# Patient Record
Sex: FEMALE | Race: BLACK OR AFRICAN AMERICAN | NOT HISPANIC OR LATINO | Employment: UNEMPLOYED | URBAN - METROPOLITAN AREA
[De-identification: names, ages, dates, MRNs, and addresses within clinical notes are randomized per-mention and may not be internally consistent; named-entity substitution may affect disease eponyms.]

---

## 2018-08-22 ENCOUNTER — OFFICE VISIT (OUTPATIENT)
Dept: URGENT CARE | Facility: CLINIC | Age: 48
End: 2018-08-22
Payer: COMMERCIAL

## 2018-08-22 VITALS
DIASTOLIC BLOOD PRESSURE: 76 MMHG | WEIGHT: 184 LBS | OXYGEN SATURATION: 99 % | HEART RATE: 70 BPM | BODY MASS INDEX: 31.41 KG/M2 | TEMPERATURE: 98 F | SYSTOLIC BLOOD PRESSURE: 119 MMHG | HEIGHT: 64 IN | RESPIRATION RATE: 16 BRPM

## 2018-08-22 DIAGNOSIS — K64.9 HEMORRHOIDS, UNSPECIFIED HEMORRHOID TYPE: ICD-10-CM

## 2018-08-22 DIAGNOSIS — R42 DIZZINESS: Primary | ICD-10-CM

## 2018-08-22 DIAGNOSIS — H65.03 BILATERAL ACUTE SEROUS OTITIS MEDIA, RECURRENCE NOT SPECIFIED: ICD-10-CM

## 2018-08-22 DIAGNOSIS — B34.9 VIRAL SYNDROME: ICD-10-CM

## 2018-08-22 PROCEDURE — 99214 OFFICE O/P EST MOD 30 MIN: CPT | Mod: S$GLB,,, | Performed by: NURSE PRACTITIONER

## 2018-08-22 RX ORDER — MECLIZINE HCL 12.5 MG 12.5 MG/1
12.5 TABLET ORAL 3 TIMES DAILY PRN
Qty: 12 TABLET | Refills: 0 | Status: SHIPPED | OUTPATIENT
Start: 2018-08-22 | End: 2019-05-02

## 2018-08-22 RX ORDER — PREDNISONE 10 MG/1
10 TABLET ORAL DAILY
Qty: 5 TABLET | Refills: 0 | Status: SHIPPED | OUTPATIENT
Start: 2018-08-22 | End: 2018-08-27

## 2018-08-22 RX ORDER — ALPRAZOLAM 1 MG/1
1 TABLET ORAL 2 TIMES DAILY
COMMUNITY

## 2018-08-22 RX ORDER — LIDOCAINE HYDROCHLORIDE AND HYDROCORTISONE ACETATE 30; 5 MG/G; MG/G
1 CREAM RECTAL 2 TIMES DAILY
Qty: 20 KIT | Refills: 0 | Status: SHIPPED | OUTPATIENT
Start: 2018-08-22 | End: 2019-03-04 | Stop reason: SDUPTHER

## 2018-08-22 RX ORDER — FLUTICASONE PROPIONATE 50 MCG
1 SPRAY, SUSPENSION (ML) NASAL 2 TIMES DAILY
Qty: 1 BOTTLE | Refills: 2 | Status: SHIPPED | OUTPATIENT
Start: 2018-08-22

## 2018-08-22 NOTE — PATIENT INSTRUCTIONS
Inner Ear Problems: Causes of Dizziness (Vertigo)       Benign positional vertigo (BPV)  This is the most common cause of vertigo. BPV is also called benign positional paroxysmal vertigo (BPPV). It happens when crystals in the ear canals shift into the wrong place. Vertigo usually occurs when you move your head in a certain way. This can happen when turning in bed, bending, or looking up. Because BPV comes on quickly, you should think about if you are safe to drive or do other tasks that need your full attention.  BPV:  · Causes vertigo that last for seconds. Vertigo can occur several times a day, depending on body position.  · Doesnt cause hearing loss  · Often goes away on its own. But it but may go away sooner with treatment.  Infection or inflammation  Sometimes the semicircular canals swell and send incorrect balance signals. This problem may be caused by a viral infection. Depending on the cause, your hearing can be affected (labyrinthitis). Or your hearing can remain normal (neuronitis).  Infection or inflammation:  · Causes vertigo that lasts for hours or days. The first episode is usually the worst.  · Can cause hearing loss  · Often goes away on its own. But it may go away sooner with treatment.  You may need vestibular rehabilitation if you have balance problems that don't go away.  Menieres disease  This condition is uncommon. It happens when there is too much fluid in the ear canals. This causes increased pressure and swelling. It affects balance and hearing signals.  Menieres disease may:  · Cause vertigo that last for hours  · Cause hearing problems that come and go. The problems are usually in one ear and get worse over time.  · Cause buzzing or ringing in the ears (tinnitus)  · Cause a feeling of fullness or pressure in the ear  · Cause any of these symptoms: vertigo, hearing loss, tinnitus, or ear fullness to last a lifetime  Date Last Reviewed: 11/1/2016  © 6179-9004 [employer] [address] [place] [address]. All rights reserved. This information is not intended as a substitute for professional medical care. Always follow your healthcare professional's instructions.

## 2018-08-22 NOTE — PROGRESS NOTES
"Subjective:       Patient ID: Ana Maria Turcios is a 47 y.o. female.    Vitals:  height is 5' 4" (1.626 m) and weight is 83.5 kg (184 lb).     Chief Complaint: Dizziness    48 y/o female new to me presents with c/o Dizziness that started x 1 week. Reports she is also struggling with hemorrhoids. Reports being under the care of Dr. Grove.       Dizziness:   Chronicity:  New  Onset:  Yesterday  Progression since onset:  Unchanged  Frequency:  Every few minutes  Pain Scale:  10/10  Severity:  Severe  Duration:  5 minutes  Dizziness characteristics:  Off-balance and lightheaded/impending faint  Frequency of Spells:  Hourlyno fever, no headaches, no nausea, no vomiting, no diaphoresis, no weakness and no chest pain.  Aggravated by:  Nothing  Treatments tried:  Nothing   PMH includes: anxiety.    Review of Systems   Constitution: Negative for chills, diaphoresis, fever, weakness, malaise/fatigue and night sweats.   HENT: Negative for congestion and sore throat.    Cardiovascular: Negative for chest pain.   Respiratory: Negative for cough, shortness of breath and sputum production.    Musculoskeletal: Negative for back pain and joint pain.   Gastrointestinal: Positive for constipation. Negative for abdominal pain, diarrhea, nausea and vomiting.   Neurological: Positive for dizziness. Negative for headaches.        Feels like she is on a boat. Makes eyes heavy   Psychiatric/Behavioral: The patient is nervous/anxious (admits she has been under a lot of stress and not sleeping well. ).        Objective:      Physical Exam   Constitutional: She is oriented to person, place, and time. She appears well-developed and well-nourished.   HENT:   Head: Normocephalic and atraumatic.   Nose: Nose normal.   Mouth/Throat: Oropharyngeal exudate present.   Bilateral pharyngeal erythema, bilateral fluid   Cardiovascular: Normal rate, regular rhythm and normal heart sounds.   Pulmonary/Chest: Effort normal and breath sounds normal. "   Abdominal: Soft. Bowel sounds are normal. There is no tenderness.   Musculoskeletal: She exhibits no edema.   Neurological: She is alert and oriented to person, place, and time.   Skin: Skin is warm and dry.   Psychiatric: She has a normal mood and affect. Her behavior is normal. Judgment and thought content normal.   Nursing note and vitals reviewed.      Assessment:       1. Dizziness    2. Viral syndrome    3. Bilateral acute serous otitis media, recurrence not specified        Plan:         1. Dizziness  Advised that I do believe its secondary to ears and viral syndrome that she is having this. Orthostatics ok.   - meclizine (ANTIVERT) 12.5 mg tablet; Take 1 tablet (12.5 mg total) by mouth 3 (three) times daily as needed for Dizziness.  Dispense: 12 tablet; Refill: 0    2. Viral syndrome  Advised on fluids rest and meds as directed.   - fluticasone (FLONASE) 50 mcg/actuation nasal spray; 1 spray (50 mcg total) by Each Nare route 2 (two) times daily.  Dispense: 1 Bottle; Refill: 2  - predniSONE (DELTASONE) 10 MG tablet; Take 1 tablet (10 mg total) by mouth once daily. for 5 doses  Dispense: 5 tablet; Refill: 0    3. Bilateral acute serous otitis media, recurrence not specified    - fluticasone (FLONASE) 50 mcg/actuation nasal spray; 1 spray (50 mcg total) by Each Nare route 2 (two) times daily.  Dispense: 1 Bottle; Refill: 2  - predniSONE (DELTASONE) 10 MG tablet; Take 1 tablet (10 mg total) by mouth once daily. for 5 doses  Dispense: 5 tablet; Refill: 0

## 2018-11-01 ENCOUNTER — OFFICE VISIT (OUTPATIENT)
Dept: SURGERY | Facility: CLINIC | Age: 48
End: 2018-11-01
Payer: COMMERCIAL

## 2018-11-01 VITALS
HEART RATE: 62 BPM | BODY MASS INDEX: 33.27 KG/M2 | DIASTOLIC BLOOD PRESSURE: 77 MMHG | SYSTOLIC BLOOD PRESSURE: 140 MMHG | HEIGHT: 64 IN | WEIGHT: 194.88 LBS

## 2018-11-01 DIAGNOSIS — K60.2 ANAL FISSURE: Primary | ICD-10-CM

## 2018-11-01 PROCEDURE — 99999 PR PBB SHADOW E&M-EST. PATIENT-LVL III: CPT | Mod: PBBFAC,,, | Performed by: COLON & RECTAL SURGERY

## 2018-11-01 PROCEDURE — 99203 OFFICE O/P NEW LOW 30 MIN: CPT | Mod: S$GLB,,, | Performed by: COLON & RECTAL SURGERY

## 2018-11-01 NOTE — PATIENT INSTRUCTIONS
Increased fiber intake (20-25 grams/day) and fluid intake (8-10 glasses water/day)  Daily fiber supplement  Soaks/sitz baths  Avoid excessive trauma/straining if possible  Topical diltiazem 2% 3x/day

## 2018-11-01 NOTE — LETTER
November 11, 2018      Ernesto Grove MD  [address]  [address]  [facility] [address]           [facility]  [address] [place] [id number]  Phone: [phone]  Fax: [phone]          Patient: Ana Maria Turcios   MR Number: [id number]   YOB: 1970   Date of Visit: 11/1/2018       Dear Dr. Grove:    Thank you for referring Ana Maria Turcios to me for evaluation. Attached you will find relevant portions of my assessment and plan of care.    If you have questions, please do not hesitate to call me. I look forward to following Ana Maria Turcios along with you.    Sincerely,    Trey Streeter MD    Enclosure  CC:  Gvoind Ortiz MD    If you would like to receive this communication electronically, please contact [email or web address] or [phone] to request more information on [employer] access.    For providers and/or their staff who would like to refer a patient to [facility], please contact us through our one-stop-shop provider referral line, [facility] [place], at [phone].    If you feel you have received this communication in error or would no longer like to receive these types of communications, please e-mail [email or web address]

## 2018-11-11 NOTE — PROGRESS NOTES
Subjective:       Patient ID: Ana Maria uTrcios is a 47 y.o. female.    Chief Complaint: Hemorrhoids    HPI  46 yo F who presents for evaluation of rectal bleeding and anal pain. She suffers from chronic constipation and takes Linzess.  She complains of sharp tearing pain with her bowel movements and bright red blood on the toilet paper when she wipes after bowel movements.  She underwent a recent colonoscopy by , which I do not have a report from, but the patient states that was normal aside from hemorrhoids.  No abdominal pain, unexplained weight loss, anorexia, recent change in bowel habits, or other constitutional symptoms.     No family hx of CRC or IBD.      Review of patient's allergies indicates:   Allergen Reactions    Penicillins Itching       Past Medical History:   Diagnosis Date    Anxiety     Chronic constipation     GERD (gastroesophageal reflux disease)     Headache, migraine     Restless leg syndrome        Past Surgical History:   Procedure Laterality Date    ANKLE SURGERY      ANKLE SURGERY Right     BREAST SURGERY  04/2014    left breast cyst    HEMORRHOID SURGERY      TUBAL LIGATION  1997       Current Outpatient Medications   Medication Sig Dispense Refill    ALPRAZolam (XANAX) 1 MG tablet Take 1 mg by mouth 2 (two) times daily.      fluticasone (FLONASE) 50 mcg/actuation nasal spray 1 spray (50 mcg total) by Each Nare route 2 (two) times daily. 1 Bottle 2    lidocaine HCl-hydrocortison ac 3-0.5 % Kit Place 1 application rectally 2 (two) times daily. 20 kit 0    linaclotide (LINZESS) 145 mcg Cap capsule Take 145 mcg by mouth once daily.      meclizine (ANTIVERT) 12.5 mg tablet Take 1 tablet (12.5 mg total) by mouth 3 (three) times daily as needed for Dizziness. 12 tablet 0    trazodone (DESYREL) 25 MG tablet Take by mouth every evening.      diltiazem HCl (DILTIAZEM 2% CREAM) Apply topically 3 (three) times daily. Apply topically to anal area. 50 g 5     No current  facility-administered medications for this visit.        Family History   Problem Relation Age of Onset    Cancer Mother     Stomach cancer Mother     Diabetes Mother     Cancer Father     Throat cancer Father     Cancer Sister     No Known Problems Brother     Hypertension Sister     Hypertension Sister     Hypertension Sister     No Known Problems Brother     No Known Problems Brother     No Known Problems Brother     No Known Problems Brother        Social History     Socioeconomic History    Marital status:      Spouse name: None    Number of children: None    Years of education: None    Highest education level: None   Social Needs    Financial resource strain: None    Food insecurity - worry: None    Food insecurity - inability: None    Transportation needs - medical: None    Transportation needs - non-medical: None   Occupational History    None   Tobacco Use    Smoking status: Current Every Day Smoker     Packs/day: 0.25     Years: 20.00     Pack years: 5.00     Types: Cigarettes    Smokeless tobacco: Never Used   Substance and Sexual Activity    Alcohol use: Yes     Alcohol/week: 0.0 oz     Comment: Socially    Drug use: No    Sexual activity: Yes     Partners: Male     Birth control/protection: Surgical   Other Topics Concern    None   Social History Narrative    None       Review of Systems   Constitutional: Negative for chills and fever.   HENT: Negative for congestion and sore throat.    Eyes: Negative for visual disturbance.   Respiratory: Negative for cough and shortness of breath.    Cardiovascular: Negative for chest pain and palpitations.   Gastrointestinal: Positive for anal bleeding, constipation and rectal pain. Negative for abdominal distention, abdominal pain, blood in stool, diarrhea, nausea and vomiting.        GERD   Endocrine: Negative for cold intolerance and heat intolerance.   Genitourinary: Negative for dysuria and frequency.   Musculoskeletal:  Negative for arthralgias, back pain and neck pain.   Skin: Negative for rash.   Allergic/Immunologic: Negative for immunocompromised state.   Neurological: Positive for headaches. Negative for dizziness and light-headedness.   Hematological: Does not bruise/bleed easily.   Psychiatric/Behavioral: Negative for confusion. The patient is nervous/anxious.        Objective:      Physical Exam   Constitutional: She is oriented to person, place, and time. She appears well-developed and well-nourished.   HENT:   Head: Normocephalic.   Pulmonary/Chest: Effort normal. No respiratory distress.   Abdominal: Soft. Bowel sounds are normal. She exhibits no distension and no mass. There is no tenderness. There is no rebound and no guarding.   Genitourinary:   Genitourinary Comments: Perineum - posterior midline anal fissure, anterior and posterior midline inflamed anal skin tags, otherwise normal perianal skin, no mass, no external hemorrhoids  CORTES/Anoscopy - deferred   Musculoskeletal: Normal range of motion.   Neurological: She is alert and oriented to person, place, and time.   Skin: Skin is warm and dry.   Psychiatric: She has a normal mood and affect.         Lab Results   Component Value Date    WBC 11.50 01/22/2015    HGB 13.0 01/22/2015    HCT 40.2 01/22/2015    MCV 90 01/22/2015     01/22/2015     BMP  Lab Results   Component Value Date     01/22/2015    K 4.0 01/22/2015     01/22/2015    CO2 30 (H) 01/22/2015    BUN 9 01/22/2015    CREATININE 0.8 01/22/2015    CALCIUM 8.9 01/22/2015    ANIONGAP 6 (L) 01/22/2015    ESTGFRAFRICA >60.0 01/22/2015    EGFRNONAA >60.0 01/22/2015     CMP  Sodium   Date Value Ref Range Status   01/22/2015 140 136 - 145 mmol/L Final     Potassium   Date Value Ref Range Status   01/22/2015 4.0 3.5 - 5.1 mmol/L Final     Chloride   Date Value Ref Range Status   01/22/2015 104 95 - 110 mmol/L Final     CO2   Date Value Ref Range Status   01/22/2015 30 (H) 23 - 29 mmol/L Final      Glucose   Date Value Ref Range Status   01/22/2015 90 70 - 110 mg/dL Final     BUN, Bld   Date Value Ref Range Status   01/22/2015 9 6 - 20 mg/dL Final     Creatinine   Date Value Ref Range Status   01/22/2015 0.8 0.5 - 1.4 mg/dL Final     Calcium   Date Value Ref Range Status   01/22/2015 8.9 8.7 - 10.5 mg/dL Final     Total Protein   Date Value Ref Range Status   01/22/2015 6.7 6.0 - 8.4 g/dL Final     Albumin   Date Value Ref Range Status   01/22/2015 3.4 (L) 3.5 - 5.2 g/dL Final     Total Bilirubin   Date Value Ref Range Status   01/22/2015 0.6 0.1 - 1.0 mg/dL Final     Comment:     For infants and newborns, interpretation of results should be based  on gestational age, weight and in agreement with clinical  observations.  Premature Infant recommended reference ranges:  Up to 24 hours.............<8.0 mg/dL  Up to 48 hours............<12.0 mg/dL  3-5 days..................<15.0 mg/dL  6-29 days.................<15.0 mg/dL       Alkaline Phosphatase   Date Value Ref Range Status   01/22/2015 47 (L) 55 - 135 U/L Final     AST   Date Value Ref Range Status   01/22/2015 17 10 - 40 U/L Final     ALT   Date Value Ref Range Status   01/22/2015 18 10 - 44 U/L Final     Anion Gap   Date Value Ref Range Status   01/22/2015 6 (L) 8 - 16 mmol/L Final     eGFR if    Date Value Ref Range Status   01/22/2015 >60.0 >60 mL/min/1.73 m^2 Final     eGFR if non    Date Value Ref Range Status   01/22/2015 >60.0 >60 mL/min/1.73 m^2 Final     Comment:     Calculation used to obtain the estimated glomerular filtration  rate (eGFR) is the CKD-EPI equation. Since race is unknown   in our information system, the eGFR values for   -American and Non--American patients are given   for each creatinine result.       No results found for: CEA        Assessment:       1. Anal fissure        Plan:   Increased fiber intake (20-25 grams/day) and fluid intake (8-10 glasses water/day)  Daily fiber  supplement  Soaks/sitz baths  Avoid excessive trauma/straining if possible  Topical diltiazem 2% tid.  RTO 4 weeks - if no improvement, will consider LIAS.      Trey Streeter MD, FACS, FASCRS  Senior Staff Surgeon  Department of Colon & Rectal Surgery

## 2018-12-19 ENCOUNTER — TELEPHONE (OUTPATIENT)
Dept: SURGERY | Facility: CLINIC | Age: 48
End: 2018-12-19

## 2018-12-19 NOTE — TELEPHONE ENCOUNTER
----- Message from Hayley Lopez sent at 12/19/2018  2:05 PM CST -----  Contact: self [phone]  Needs Advice    Reason for call: Pt called inquiring if the appt needs to be rescheduled on 12/20 in [place]         Communication Preference: self [phone]    Additional Information:

## 2019-03-04 ENCOUNTER — OFFICE VISIT (OUTPATIENT)
Dept: URGENT CARE | Facility: CLINIC | Age: 49
End: 2019-03-04
Payer: COMMERCIAL

## 2019-03-04 ENCOUNTER — TELEPHONE (OUTPATIENT)
Dept: SURGERY | Facility: CLINIC | Age: 49
End: 2019-03-04

## 2019-03-04 VITALS
HEIGHT: 64 IN | BODY MASS INDEX: 33.12 KG/M2 | RESPIRATION RATE: 18 BRPM | TEMPERATURE: 98 F | HEART RATE: 69 BPM | DIASTOLIC BLOOD PRESSURE: 87 MMHG | WEIGHT: 194 LBS | OXYGEN SATURATION: 98 % | SYSTOLIC BLOOD PRESSURE: 131 MMHG

## 2019-03-04 DIAGNOSIS — K60.2 ANAL FISSURE: ICD-10-CM

## 2019-03-04 DIAGNOSIS — K64.9 HEMORRHOIDS, UNSPECIFIED HEMORRHOID TYPE: Primary | ICD-10-CM

## 2019-03-04 PROCEDURE — 99214 OFFICE O/P EST MOD 30 MIN: CPT | Mod: S$GLB,,, | Performed by: PHYSICIAN ASSISTANT

## 2019-03-04 PROCEDURE — 99214 PR OFFICE/OUTPT VISIT, EST, LEVL IV, 30-39 MIN: ICD-10-PCS | Mod: S$GLB,,, | Performed by: PHYSICIAN ASSISTANT

## 2019-03-04 RX ORDER — LIDOCAINE HYDROCHLORIDE AND HYDROCORTISONE ACETATE 30; 5 MG/G; MG/G
1 CREAM RECTAL 2 TIMES DAILY
Qty: 20 KIT | Refills: 0 | Status: ON HOLD | OUTPATIENT
Start: 2019-03-04 | End: 2019-04-11 | Stop reason: HOSPADM

## 2019-03-04 RX ORDER — HYDROCORTISONE ACETATE 25 MG/1
SUPPOSITORY RECTAL
Refills: 2 | COMMUNITY
Start: 2018-11-26 | End: 2019-03-04 | Stop reason: SDUPTHER

## 2019-03-04 RX ORDER — HYDROCORTISONE ACETATE 25 MG/1
25 SUPPOSITORY RECTAL 2 TIMES DAILY PRN
Qty: 12 SUPPOSITORY | Refills: 2 | Status: ON HOLD | OUTPATIENT
Start: 2019-03-04 | End: 2019-04-11 | Stop reason: HOSPADM

## 2019-03-04 RX ORDER — ESCITALOPRAM OXALATE 20 MG/1
TABLET ORAL
Refills: 3 | COMMUNITY
Start: 2018-12-26

## 2019-03-04 RX ORDER — TRAZODONE HYDROCHLORIDE 100 MG/1
TABLET ORAL
Refills: 3 | COMMUNITY
Start: 2018-12-27 | End: 2019-03-04 | Stop reason: ALTCHOICE

## 2019-03-04 NOTE — PATIENT INSTRUCTIONS
1.  Take all medications as directed. If you have been prescribed antibiotics, make sure to complete them.   2.  Rest and keep yourself/patient well hydrated. For adults, it is recommended to drink at least 8-10 glasses of water daily.   3.  For patients above 6 months of age who are not allergic to and are not on anticoagulants, you can alternate Tylenol and Motrin every 4-6 hours for fever above 100.4F and/or pain.  For patients less than 6 months of age, allergic to or intolerant to NSAIDS, have gastritis, gastric ulcers, or history of GI bleeds, are pregnant, or are on anticoagulant therapy, you can take Tylenol every 4 hours as needed for fever above 100.4F and/or pain.   4. You should schedule a follow-up appointment with your Primary Care Provider/Pediatrician for recheck in 2-3 days or as directed at this visit.   5.  If your condition fails to improve in a timely manner, you should receive another evaluation by your Primary Care Provider/Pediatrician to discuss your concerns or return to urgent care for a recheck.  If your condition worsens at any time, you should report immediately to your nearest Emergency Department for further evaluation. **You must understand that you have received Urgent Care treatment only and that you may be released before all of your medical problems are known or treated. You, the patient, are responsible to arrange for follow-up care as instructed.         Hemorrhoids    Hemorrhoids are swollen and inflamed veins inside the rectum and near the anus. The rectum is the last several inches of the colon. The anus is the passage between the rectum and the outside of the body.  Causes  The veins can become swollen due to increased pressure in them. This is most often caused by:  · Chronic constipation or diarrhea  · Straining when having a bowel movement  · Sitting too long on the toilet  · A low-fiber diet  · Pregnancy  Symptoms  · Bleeding from the rectum (this may be noticeable  after bowel movements)  · Lump near the anus  · Itching around the anus  · Pain around the anus  There are different types of hemorrhoids. Depending on the type you have and the severity, you may be able to treat yourself at home. In some cases, a procedure may be the best treatment option. Your healthcare provider can tell you more about this, if needed.  Home care  General care  · To get relief from pain or itching, try:  ¨ Topical products. Your healthcare provider may prescribe or recommend creams, ointments, or pads that can be applied to the hemorrhoid. Use these exactly as directed.  ¨ Medicines. Your healthcare provider may recommend stool softeners, suppositories, or laxatives to help manage constipation. Use these exactly as directed.  ¨ Sitz baths. A sitz bath involves sitting in a few inches of warm bath water. Be careful not to make the water so hot that you burn yourself--test it before sitting in it. Soak for about 10 to 15 minutes a few times a day. This may help relieve pain.  Tips to help prevent hemorrhoids  · Eat more fiber. Fiber adds bulk to stool and absorbs water as it moves through your colon. This makes stool softer and easier to pass.  ¨ Increase the fiber in your diet with more fiber-rich foods. These include fresh fruit, vegetables, and whole grains.  ¨ Take a fiber supplement or bulking agent, if advised to by your provider. These include products such as psyllium or methylcellulose.  · Drink plenty of water, if directed to by your provider. This can help keep stool soft.  · Be more active. Frequent exercise aids digestion and helps prevent constipation. It may also help make bowel movements more regular.  · Dont strain during bowel movements. This can make hemorrhoids more likely. Also, dont sit on the toilet for long periods of time.  Follow-up care  Follow up with your healthcare provider, or as advised. If a culture or imaging tests were done, you will be notified of the results  when they are ready. This may take a few days or longer.  When to seek medical advice  Call your healthcare provider right away if any of these occur:  · Increased bleeding from the rectum  · Increased pain around the rectum or anus  · Weakness or dizziness  Call 911  Call 911 or return to the emergency department right away if any of these occur:  · Trouble breathing or swallowing  · Fainting or loss of consciousness  · Unusually fast heart rate  · Vomiting blood  · Large amounts of blood in stool  Date Last Reviewed: 6/22/2015  © 8621-1208 [employer]. [address] [place] [address]. All rights reserved. This information is not intended as a substitute for professional medical care. Always follow your healthcare professional's instructions.

## 2019-03-04 NOTE — TELEPHONE ENCOUNTER
----- Message from ROSETTE Henley sent at 3/4/2019  9:06 AM CST -----  Contact: pt [phone]  Yes can be filled  ----- Message -----  From: Antoinette Montes De Oca RN  Sent: 3/4/2019   8:54 AM  To: ROSETTE Henley    Can you fill this or should I send it to [facility] to be filled on Wednesday?  Thank you,  Antoinette Rodriguez  ----- Message -----  From: Kenya Bonner  Sent: 3/4/2019   8:32 AM  To: Ferdinand Yang Staff    Rx Refill/Request     Is this a Refill or New Rx:  refill  Rx Name and Strength:  diltiazem HCl (DILTIAZEM 2% CREAM)  Preferred Pharmacy with phone number:   [facility] [phone]    Communication Preference:  Additional Information:

## 2019-03-04 NOTE — PROGRESS NOTES
"Subjective:       Patient ID: Ana Maria Turcios is a 48 y.o. female.    Vitals:  height is 5' 4" (1.626 m) and weight is 88 kg (194 lb). Her tympanic temperature is 98 °F (36.7 °C). Her blood pressure is 131/87 and her pulse is 69. Her respiration is 18 and oxygen saturation is 98%.     Chief Complaint: Hemorrhoids    48-year-old female presents to clinic today requesting a refill of her medications for her hemorrhoids.  Patient states that she sees a colorectal surgeon for these issues but was unable to get an appointment until March 21st.  Patient states that she is currently out of both of her medications and does not feel that she can wait this long for treatment.  Patient reports pain with bowel movements as well as bright red rectal bleeding for the past 4 days.  Patient states that she ate boiled seafood and feels this may have triggered her symptoms.  She denies any other complaints at this time.      Rectal Bleeding   This is a recurrent problem. The current episode started in the past 7 days. The problem occurs constantly. The problem has been gradually worsening. Pertinent negatives include no abdominal pain, arthralgias, chest pain, chills, congestion, fever, headaches, joint swelling, myalgias, nausea, neck pain, rash, sore throat, urinary symptoms, vertigo, visual change, vomiting or weakness. Nothing aggravates the symptoms. Treatments tried: Diltiazem, Lidocaine cream, Preparation H wipes. The treatment provided no relief.       Constitution: Negative for chills and fever.   HENT: Negative for congestion and sore throat.    Neck: Negative for neck pain and painful lymph nodes.   Cardiovascular: Negative for chest pain and leg swelling.   Eyes: Negative for double vision and blurred vision.   Respiratory: Negative for shortness of breath.    Gastrointestinal: Positive for bright red blood in stool, rectal bleeding, rectal pain and hemorrhoids. Negative for abdominal pain, nausea, vomiting, " diarrhea, dark colored stools, heartburn and bowel incontinence.   Genitourinary: Negative for dysuria, frequency, urgency and history of kidney stones.   Musculoskeletal: Negative for joint pain, joint swelling, muscle cramps and muscle ache.   Skin: Negative for color change, pale, rash and bruising.   Allergic/Immunologic: Negative for seasonal allergies.   Neurological: Negative for dizziness, history of vertigo, light-headedness, passing out and headaches.   Hematologic/Lymphatic: Negative for swollen lymph nodes.   Psychiatric/Behavioral: Negative for nervous/anxious, sleep disturbance and depression. The patient is not nervous/anxious.        Objective:      Physical Exam   Constitutional: She is oriented to person, place, and time. She appears well-developed and well-nourished. No distress.   HENT:   Head: Normocephalic and atraumatic.   Right Ear: External ear normal.   Left Ear: External ear normal.   Nose: Nose normal.   Mouth/Throat: Uvula is midline, oropharynx is clear and moist and mucous membranes are normal.   Eyes: Conjunctivae, EOM and lids are normal. Pupils are equal, round, and reactive to light.   Neck: Normal range of motion. Neck supple.   Cardiovascular: Normal rate, regular rhythm and normal heart sounds.   Pulmonary/Chest: Effort normal and breath sounds normal. No respiratory distress.   Abdominal: Soft. Normal appearance and bowel sounds are normal. She exhibits no distension and no mass. There is no tenderness.   Musculoskeletal: Normal range of motion.   Neurological: She is alert and oriented to person, place, and time. She has normal strength. No cranial nerve deficit or sensory deficit.   Skin: Skin is warm. Capillary refill takes less than 2 seconds.   Psychiatric: She has a normal mood and affect. Her speech is normal and behavior is normal. Judgment and thought content normal. Cognition and memory are normal.   Nursing note and vitals reviewed.      Assessment:       1.  Hemorrhoids, unspecified hemorrhoid type    2. Anal fissure        Plan:         Hemorrhoids, unspecified hemorrhoid type  -     lidocaine HCl-hydrocortison ac 3-0.5 % Kit; Place 1 application rectally 2 (two) times daily.  Dispense: 20 kit; Refill: 0    Anal fissure  -     diltiazem HCl (DILTIAZEM 2% CREAM); Apply topically 3 (three) times daily. Apply topically to anal area.  Dispense: 50 g; Refill: 5     Medications refilled as prescribed by colorectal surgeon.  Patient instructed to be sure that she keeps follow-up appointment on March 21st.  ER if worse at any time.    Patient Instructions   1.  Take all medications as directed. If you have been prescribed antibiotics, make sure to complete them.   2.  Rest and keep yourself/patient well hydrated. For adults, it is recommended to drink at least 8-10 glasses of water daily.   3.  For patients above 6 months of age who are not allergic to and are not on anticoagulants, you can alternate Tylenol and Motrin every 4-6 hours for fever above 100.4F and/or pain.  For patients less than 6 months of age, allergic to or intolerant to NSAIDS, have gastritis, gastric ulcers, or history of GI bleeds, are pregnant, or are on anticoagulant therapy, you can take Tylenol every 4 hours as needed for fever above 100.4F and/or pain.   4. You should schedule a follow-up appointment with your Primary Care Provider/Pediatrician for recheck in 2-3 days or as directed at this visit.   5.  If your condition fails to improve in a timely manner, you should receive another evaluation by your Primary Care Provider/Pediatrician to discuss your concerns or return to urgent care for a recheck.  If your condition worsens at any time, you should report immediately to your nearest Emergency Department for further evaluation. **You must understand that you have received Urgent Care treatment only and that you may be released before all of your medical problems are known or treated. You, the  patient, are responsible to arrange for follow-up care as instructed.         Hemorrhoids    Hemorrhoids are swollen and inflamed veins inside the rectum and near the anus. The rectum is the last several inches of the colon. The anus is the passage between the rectum and the outside of the body.  Causes  The veins can become swollen due to increased pressure in them. This is most often caused by:  · Chronic constipation or diarrhea  · Straining when having a bowel movement  · Sitting too long on the toilet  · A low-fiber diet  · Pregnancy  Symptoms  · Bleeding from the rectum (this may be noticeable after bowel movements)  · Lump near the anus  · Itching around the anus  · Pain around the anus  There are different types of hemorrhoids. Depending on the type you have and the severity, you may be able to treat yourself at home. In some cases, a procedure may be the best treatment option. Your healthcare provider can tell you more about this, if needed.  Home care  General care  · To get relief from pain or itching, try:  ¨ Topical products. Your healthcare provider may prescribe or recommend creams, ointments, or pads that can be applied to the hemorrhoid. Use these exactly as directed.  ¨ Medicines. Your healthcare provider may recommend stool softeners, suppositories, or laxatives to help manage constipation. Use these exactly as directed.  ¨ Sitz baths. A sitz bath involves sitting in a few inches of warm bath water. Be careful not to make the water so hot that you burn yourself--test it before sitting in it. Soak for about 10 to 15 minutes a few times a day. This may help relieve pain.  Tips to help prevent hemorrhoids  · Eat more fiber. Fiber adds bulk to stool and absorbs water as it moves through your colon. This makes stool softer and easier to pass.  ¨ Increase the fiber in your diet with more fiber-rich foods. These include fresh fruit, vegetables, and whole grains.  ¨ Take a fiber supplement or bulking  agent, if advised to by your provider. These include products such as psyllium or methylcellulose.  · Drink plenty of water, if directed to by your provider. This can help keep stool soft.  · Be more active. Frequent exercise aids digestion and helps prevent constipation. It may also help make bowel movements more regular.  · Dont strain during bowel movements. This can make hemorrhoids more likely. Also, dont sit on the toilet for long periods of time.  Follow-up care  Follow up with your healthcare provider, or as advised. If a culture or imaging tests were done, you will be notified of the results when they are ready. This may take a few days or longer.  When to seek medical advice  Call your healthcare provider right away if any of these occur:  · Increased bleeding from the rectum  · Increased pain around the rectum or anus  · Weakness or dizziness  Call 911  Call 911 or return to the emergency department right away if any of these occur:  · Trouble breathing or swallowing  · Fainting or loss of consciousness  · Unusually fast heart rate  · Vomiting blood  · Large amounts of blood in stool  Date Last Reviewed: 6/22/2015  © 9346-5909 [employer] [place]. [address] [place] [address], [place] [address]. All rights reserved. This information is not intended as a substitute for professional medical care. Always follow your healthcare professional's instructions.

## 2019-03-13 ENCOUNTER — HOSPITAL ENCOUNTER (EMERGENCY)
Facility: HOSPITAL | Age: 49
Discharge: HOME | End: 2019-03-13
Attending: SURGERY
Payer: COMMERCIAL

## 2019-03-13 VITALS
BODY MASS INDEX: 32.62 KG/M2 | DIASTOLIC BLOOD PRESSURE: 71 MMHG | SYSTOLIC BLOOD PRESSURE: 149 MMHG | TEMPERATURE: 97 F | OXYGEN SATURATION: 100 % | HEART RATE: 72 BPM | WEIGHT: 190.06 LBS | RESPIRATION RATE: 18 BRPM

## 2019-03-13 DIAGNOSIS — K60.2 ANAL FISSURE: Primary | ICD-10-CM

## 2019-03-13 PROCEDURE — 99283 EMERGENCY DEPT VISIT LOW MDM: CPT

## 2019-03-13 RX ORDER — KETOROLAC TROMETHAMINE 10 MG/1
10 TABLET, FILM COATED ORAL EVERY 6 HOURS PRN
Qty: 15 TABLET | Refills: 0 | Status: SHIPPED | OUTPATIENT
Start: 2019-03-13

## 2019-03-13 NOTE — ED NOTES
Discharged to home/self care.    - Condition at discharge: Good  - Mode of Discharge: Ambulatory  - The patient left the ED accompanied by self.  - The discharge instructions were discussed with the patient.  - She states an understanding of the discharge instructions.  - Walked pt to the discharge station.

## 2019-03-13 NOTE — ED PROVIDER NOTES
Encounter Date: 3/13/2019       History     Chief Complaint   Patient presents with    Hemorrhoids     Patient is 48-year-old black female with anal fissure, seen by colon and rectal surgery 2 weeks ago for this.  She is on a conservative management program at this time, scheduled to see colon rectal surgery back in about 2 weeks.  She is having some pain in the area, seeking relief for that at this time.  She also is taking chronic benzodiazepines.          Review of patient's allergies indicates:   Allergen Reactions    Penicillins Itching     Past Medical History:   Diagnosis Date    Anxiety     Chronic constipation     GERD (gastroesophageal reflux disease)     Headache, migraine     Restless leg syndrome      Past Surgical History:   Procedure Laterality Date    ANKLE SURGERY      ANKLE SURGERY Right     BREAST SURGERY  04/2014    left breast cyst    HEMORRHOID SURGERY      TUBAL LIGATION  1997     Family History   Problem Relation Age of Onset    Cancer Mother     Stomach cancer Mother     Diabetes Mother     Cancer Father     Throat cancer Father     Cancer Sister     No Known Problems Brother     Hypertension Sister     Hypertension Sister     Hypertension Sister     No Known Problems Brother     No Known Problems Brother     No Known Problems Brother     No Known Problems Brother      Social History     Tobacco Use    Smoking status: Current Every Day Smoker     Packs/day: 0.25     Years: 20.00     Pack years: 5.00     Types: Cigarettes    Smokeless tobacco: Never Used   Substance Use Topics    Alcohol use: Yes     Alcohol/week: 0.0 oz     Comment: Socially    Drug use: No     Review of Systems   Constitutional: Negative for fever.   HENT: Negative for congestion, ear pain, rhinorrhea, sore throat and trouble swallowing.    Eyes: Negative for pain.   Respiratory: Negative for cough, shortness of breath and wheezing.    Cardiovascular: Negative for chest pain, palpitations and  leg swelling.   Gastrointestinal: Positive for constipation. Negative for abdominal pain, diarrhea and nausea.   Genitourinary: Negative for difficulty urinating, dysuria, flank pain, frequency, hematuria and urgency.   Musculoskeletal: Negative for arthralgias, back pain, myalgias and neck pain.   Skin: Negative for rash and wound.   Neurological: Negative for speech difficulty, weakness and headaches.   Hematological: Does not bruise/bleed easily.       Physical Exam     Initial Vitals [03/13/19 1315]   BP Pulse Resp Temp SpO2   (!) 149/71 72 18 97.2 °F (36.2 °C) 100 %      MAP       --         Physical Exam    Constitutional: No distress.   HENT:   Head: Normocephalic and atraumatic.   Nose: Nose normal.   Mouth/Throat: Oropharynx is clear and moist.   Eyes: Conjunctivae and EOM are normal. Pupils are equal, round, and reactive to light.   Neck: Neck supple.   Cardiovascular: Normal rate, regular rhythm, normal heart sounds and intact distal pulses.   Pulmonary/Chest: Breath sounds normal.   Abdominal: Soft. Bowel sounds are normal. There is no tenderness.   No hemorrhoids noted.  Anal fissure noted. No surrounding gluteal erythema or swelling.   Musculoskeletal: Normal range of motion.   Neurological: She is alert and oriented to person, place, and time. She has normal strength.   Skin: Skin is warm and dry.   Psychiatric: She has a normal mood and affect. Thought content normal.         ED Course   Procedures  Labs Reviewed - No data to display       Imaging Results    None                               Clinical Impression:       ICD-10-CM ICD-9-CM   1. Anal fissure K60.2 565.0         Disposition:   Disposition: Discharged  Condition: Stable                        Bryce Zapata Jr., MD  03/13/19 9315

## 2019-03-13 NOTE — ED TRIAGE NOTES
48 y.o. female presents to ER ED 01/ED 01A   Chief Complaint   Patient presents with    Hemorrhoids   Pt reports hemorrhoid for two weeks, reports that she has a history of constipation and hemorrhoid, but this one is worse than usual. No acute distress noted.

## 2019-03-15 ENCOUNTER — TELEPHONE (OUTPATIENT)
Dept: SURGERY | Facility: CLINIC | Age: 49
End: 2019-03-15

## 2019-03-15 NOTE — TELEPHONE ENCOUNTER
----- Message from Davis Mauricio sent at 3/15/2019  9:00 AM CDT -----  Contact: Pt:[phone]  .Needs Advice    Reason for call:Pt called and states she would like to speak with the nurse to get a letter stating she was out of work due to her procedure. Pt states she is still having sever pain.        Communication Preference:Pt:[phone]    Additional Information:

## 2019-03-15 NOTE — TELEPHONE ENCOUNTER
Left message on voicemail that Dr. Streeter last saw the patient in November 2018 and is unable to give any pain medicine.  There is no procedure seen or done by Dr. Streeter in her chart to write a note for work.

## 2019-03-18 ENCOUNTER — TELEPHONE (OUTPATIENT)
Dept: SURGERY | Facility: CLINIC | Age: 49
End: 2019-03-18

## 2019-03-18 NOTE — TELEPHONE ENCOUNTER
----- Message from Antoinette Gilbert sent at 3/18/2019  1:11 PM CDT -----  Contact: self   lavelle    Needs Advice    Reason for call: has questions re her appt on 3/21        Communication Preference: [phone]    Additional Information:

## 2019-04-01 ENCOUNTER — HOSPITAL ENCOUNTER (OUTPATIENT)
Dept: CARDIOLOGY | Facility: CLINIC | Age: 49
Discharge: HOME | End: 2019-04-01
Payer: COMMERCIAL

## 2019-04-01 ENCOUNTER — OFFICE VISIT (OUTPATIENT)
Dept: SURGERY | Facility: CLINIC | Age: 49
End: 2019-04-01
Payer: COMMERCIAL

## 2019-04-01 ENCOUNTER — LAB VISIT (OUTPATIENT)
Dept: LAB | Facility: HOSPITAL | Age: 49
End: 2019-04-01
Attending: COLON & RECTAL SURGERY
Payer: COMMERCIAL

## 2019-04-01 VITALS
DIASTOLIC BLOOD PRESSURE: 81 MMHG | WEIGHT: 190.06 LBS | HEART RATE: 66 BPM | BODY MASS INDEX: 32.45 KG/M2 | HEIGHT: 64 IN | SYSTOLIC BLOOD PRESSURE: 139 MMHG

## 2019-04-01 DIAGNOSIS — K60.2 ANAL FISSURE: ICD-10-CM

## 2019-04-01 DIAGNOSIS — K59.00 CONSTIPATION, UNSPECIFIED CONSTIPATION TYPE: ICD-10-CM

## 2019-04-01 DIAGNOSIS — K60.2 ANAL FISSURE: Primary | ICD-10-CM

## 2019-04-01 LAB
ANION GAP SERPL CALC-SCNC: 9 MMOL/L (ref 8–16)
BASOPHILS # BLD AUTO: 0.04 K/UL (ref 0–0.2)
BASOPHILS NFR BLD: 0.4 % (ref 0–1.9)
BUN SERPL-MCNC: 11 MG/DL (ref 6–20)
CALCIUM SERPL-MCNC: 9.9 MG/DL (ref 8.7–10.5)
CHLORIDE SERPL-SCNC: 104 MMOL/L (ref 95–110)
CO2 SERPL-SCNC: 26 MMOL/L (ref 23–29)
CREAT SERPL-MCNC: 0.7 MG/DL (ref 0.5–1.4)
DIFFERENTIAL METHOD: ABNORMAL
EOSINOPHIL # BLD AUTO: 0.1 K/UL (ref 0–0.5)
EOSINOPHIL NFR BLD: 0.9 % (ref 0–8)
ERYTHROCYTE [DISTWIDTH] IN BLOOD BY AUTOMATED COUNT: 13 % (ref 11.5–14.5)
EST. GFR  (AFRICAN AMERICAN): >60 ML/MIN/1.73 M^2
EST. GFR  (NON AFRICAN AMERICAN): >60 ML/MIN/1.73 M^2
GLUCOSE SERPL-MCNC: 76 MG/DL (ref 70–110)
HCT VFR BLD AUTO: 47.3 % (ref 37–48.5)
HGB BLD-MCNC: 14.8 G/DL (ref 12–16)
IMM GRANULOCYTES # BLD AUTO: 0.02 K/UL (ref 0–0.04)
IMM GRANULOCYTES NFR BLD AUTO: 0.2 % (ref 0–0.5)
LYMPHOCYTES # BLD AUTO: 3.8 K/UL (ref 1–4.8)
LYMPHOCYTES NFR BLD: 40.4 % (ref 18–48)
MCH RBC QN AUTO: 29.5 PG (ref 27–31)
MCHC RBC AUTO-ENTMCNC: 31.3 G/DL (ref 32–36)
MCV RBC AUTO: 94 FL (ref 82–98)
MONOCYTES # BLD AUTO: 0.7 K/UL (ref 0.3–1)
MONOCYTES NFR BLD: 7.3 % (ref 4–15)
NEUTROPHILS # BLD AUTO: 4.8 K/UL (ref 1.8–7.7)
NEUTROPHILS NFR BLD: 50.8 % (ref 38–73)
NRBC BLD-RTO: 0 /100 WBC
PLATELET # BLD AUTO: 209 K/UL (ref 150–350)
PMV BLD AUTO: 12.2 FL (ref 9.2–12.9)
POTASSIUM SERPL-SCNC: 4.4 MMOL/L (ref 3.5–5.1)
RBC # BLD AUTO: 5.02 M/UL (ref 4–5.4)
SODIUM SERPL-SCNC: 139 MMOL/L (ref 136–145)
WBC # BLD AUTO: 9.4 K/UL (ref 3.9–12.7)

## 2019-04-01 PROCEDURE — 36415 COLL VENOUS BLD VENIPUNCTURE: CPT

## 2019-04-01 PROCEDURE — 99999 PR PBB SHADOW E&M-EST. PATIENT-LVL III: CPT | Mod: PBBFAC,,, | Performed by: COLON & RECTAL SURGERY

## 2019-04-01 PROCEDURE — 93000 EKG 12-LEAD: ICD-10-PCS | Mod: S$GLB,,, | Performed by: INTERNAL MEDICINE

## 2019-04-01 PROCEDURE — 93000 ELECTROCARDIOGRAM COMPLETE: CPT | Mod: S$GLB,,, | Performed by: INTERNAL MEDICINE

## 2019-04-01 PROCEDURE — 99213 PR OFFICE/OUTPT VISIT, EST, LEVL III, 20-29 MIN: ICD-10-PCS | Mod: S$GLB,,, | Performed by: COLON & RECTAL SURGERY

## 2019-04-01 PROCEDURE — 85025 COMPLETE CBC W/AUTO DIFF WBC: CPT

## 2019-04-01 PROCEDURE — 99999 PR PBB SHADOW E&M-EST. PATIENT-LVL III: ICD-10-PCS | Mod: PBBFAC,,, | Performed by: COLON & RECTAL SURGERY

## 2019-04-01 PROCEDURE — 80048 BASIC METABOLIC PNL TOTAL CA: CPT

## 2019-04-01 PROCEDURE — 99213 OFFICE O/P EST LOW 20 MIN: CPT | Mod: S$GLB,,, | Performed by: COLON & RECTAL SURGERY

## 2019-04-01 NOTE — LETTER
April 8, 2019      Ernesto Grove MD  [address]  [address]  [facility] [address]           [facility]  [address]  [facility] [address]  Phone: [phone]          Patient: Ana Maria Turcios   MR Number: [id number]   YOB: 1970   Date of Visit: 4/1/2019       Dear Dr. Grove:    Thank you for referring Ana Maria Turcios to me for evaluation. Attached you will find relevant portions of my assessment and plan of care.    If you have questions, please do not hesitate to call me. I look forward to following Ana Maria Turcios along with you.    Sincerely,    rTey Streeter MD    Enclosure  CC:  Govind Ortiz MD    If you would like to receive this communication electronically, please contact [email or web address] or [phone] to request more information on [employer] access.    For providers and/or their staff who would like to refer a patient to [facility], please contact us through our one-stop-shop provider referral line, [facility], at [phone].    If you feel you have received this communication in error or would no longer like to receive these types of communications, please e-mail [email or web address]

## 2019-04-01 NOTE — H&P (VIEW-ONLY)
Subjective:       Patient ID: Ana Maria Turcios is a 48 y.o. female.    Chief Complaint: Follow-up    HPI  48 yo F who presented 11/1/18 for evaluation of rectal bleeding and anal pain. She suffers from chronic constipation and takes Linzess.  She complained of sharp tearing pain with her bowel movements and bright red blood on the toilet paper when she wiped after bowel movements.  She had undergone a recent colonoscopy by , which I did not have a report from, but the patient stated that was normal aside from hemorrhoids.  No abdominal pain, unexplained weight loss, anorexia, recent change in bowel habits, or other constitutional symptoms.     On examination she had a posterior midline anal fissure as well as moderately inflamed anterior and posterior midline anal skin tags.  She was started on conservative measures including topical diltiazem that he has to follow up in 4 weeks.    Despite the fact that her symptoms did not improve with the above measures, she did not follow up with me until now, 5 months later.  She had been using the diltiazem cream without any improvement.  She continues to suffer from constipation and follows up with Dr. Grove for this.  She currently is taking Linzess, MiraLax and Colace, and states that she is having 1 hard bowel movement a day with significant straining.  She continues to have pain with bowel movements which she describes as ripping and tearing..  No significant bleeding with bowel movements.    No family hx of CRC or IBD.    Review of patient's allergies indicates:   Allergen Reactions    Penicillins Itching       Past Medical History:   Diagnosis Date    Anxiety     Chronic constipation     GERD (gastroesophageal reflux disease)     Headache, migraine     Restless leg syndrome        Past Surgical History:   Procedure Laterality Date    ANKLE SURGERY      ANKLE SURGERY Right     BREAST SURGERY  04/2014    left breast cyst    HEMORRHOID SURGERY       TUBAL LIGATION  1997       Current Outpatient Medications   Medication Sig Dispense Refill    ALPRAZolam (XANAX) 1 MG tablet Take 1 mg by mouth 2 (two) times daily.      ANUCORT-HC 25 mg suppository Place 1 suppository (25 mg total) rectally 2 (two) times daily as needed for Hemorrhoids. 12 suppository 2    diltiazem HCl (DILTIAZEM 2% CREAM) Apply topically 3 (three) times daily. Apply topically to anal area. 50 g 5    escitalopram oxalate (LEXAPRO) 20 MG tablet   3    fluticasone (FLONASE) 50 mcg/actuation nasal spray 1 spray (50 mcg total) by Each Nare route 2 (two) times daily. 1 Bottle 2    ketorolac (TORADOL) 10 mg tablet Take 1 tablet (10 mg total) by mouth every 6 (six) hours as needed for Pain. 15 tablet 0    lidocaine HCl-hydrocortison ac 3-0.5 % Kit Place 1 application rectally 2 (two) times daily. 20 kit 0    linaclotide (LINZESS) 145 mcg Cap capsule Take 145 mcg by mouth once daily.      meclizine (ANTIVERT) 12.5 mg tablet Take 1 tablet (12.5 mg total) by mouth 3 (three) times daily as needed for Dizziness. 12 tablet 0    trazodone (DESYREL) 25 MG tablet Take by mouth every evening.       No current facility-administered medications for this visit.        Family History   Problem Relation Age of Onset    Cancer Mother     Stomach cancer Mother     Diabetes Mother     Cancer Father     Throat cancer Father     Cancer Sister     No Known Problems Brother     Hypertension Sister     Hypertension Sister     Hypertension Sister     No Known Problems Brother     No Known Problems Brother     No Known Problems Brother     No Known Problems Brother        Social History     Socioeconomic History    Marital status:      Spouse name: Not on file    Number of children: Not on file    Years of education: Not on file    Highest education level: Not on file   Occupational History    Not on file   Social Needs    Financial resource strain: Not on file    Food insecurity:      Worry: Not on file     Inability: Not on file    Transportation needs:     Medical: Not on file     Non-medical: Not on file   Tobacco Use    Smoking status: Current Every Day Smoker     Packs/day: 0.25     Years: 20.00     Pack years: 5.00     Types: Cigarettes    Smokeless tobacco: Never Used   Substance and Sexual Activity    Alcohol use: Yes     Alcohol/week: 0.0 oz     Comment: Socially    Drug use: No    Sexual activity: Yes     Partners: Male     Birth control/protection: Surgical   Lifestyle    Physical activity:     Days per week: Not on file     Minutes per session: Not on file    Stress: Not on file   Relationships    Social connections:     Talks on phone: Not on file     Gets together: Not on file     Attends [employer]op[employer] service: Not on file     Active member of club or organization: Not on file     Attends meetings of clubs or organizations: Not on file     Relationship status: Not on file   Other Topics Concern    Not on file   Social History Narrative    Not on file       Review of Systems   Constitutional: Negative for chills and fever.   HENT: Negative for congestion and sore throat.    Eyes: Negative for visual disturbance.   Respiratory: Negative for cough and shortness of breath.    Cardiovascular: Negative for chest pain and palpitations.   Gastrointestinal: Positive for constipation and rectal pain. Negative for abdominal distention, abdominal pain, anal bleeding, blood in stool, diarrhea, nausea and vomiting.        GERD   Endocrine: Negative for cold intolerance and heat intolerance.   Genitourinary: Negative for dysuria and frequency.   Musculoskeletal: Negative for arthralgias, back pain and neck pain.   Skin: Negative for rash.   Allergic/Immunologic: Negative for immunocompromised state.   Neurological: Positive for headaches. Negative for dizziness and light-headedness.   Hematological: Does not bruise/bleed easily.   Psychiatric/Behavioral: Negative for confusion. The patient  is nervous/anxious.        Objective:      Physical Exam   Constitutional: She is oriented to person, place, and time. She appears well-developed and well-nourished.   HENT:   Head: Normocephalic.   Pulmonary/Chest: Effort normal. No respiratory distress.   Abdominal: Soft. Bowel sounds are normal. She exhibits no distension and no mass. There is no tenderness. There is no rebound and no guarding.   Genitourinary:   Genitourinary Comments: Perineum - persistent, chronic appearing posterior midline anal fissure   Musculoskeletal: Normal range of motion.   Neurological: She is alert and oriented to person, place, and time.   Skin: Skin is warm and dry.   Psychiatric: She has a normal mood and affect.         Lab Results   Component Value Date    WBC 9.40 04/01/2019    HGB 14.8 04/01/2019    HCT 47.3 04/01/2019    MCV 94 04/01/2019     04/01/2019     BMP  Lab Results   Component Value Date     04/01/2019    K 4.4 04/01/2019     04/01/2019    CO2 26 04/01/2019    BUN 11 04/01/2019    CREATININE 0.7 04/01/2019    CALCIUM 9.9 04/01/2019    ANIONGAP 9 04/01/2019    ESTGFRAFRICA >60.0 04/01/2019    EGFRNONAA >60.0 04/01/2019     CMP  Sodium   Date Value Ref Range Status   04/01/2019 139 136 - 145 mmol/L Final     Potassium   Date Value Ref Range Status   04/01/2019 4.4 3.5 - 5.1 mmol/L Final     Chloride   Date Value Ref Range Status   04/01/2019 104 95 - 110 mmol/L Final     CO2   Date Value Ref Range Status   04/01/2019 26 23 - 29 mmol/L Final     Glucose   Date Value Ref Range Status   04/01/2019 76 70 - 110 mg/dL Final     BUN, Bld   Date Value Ref Range Status   04/01/2019 11 6 - 20 mg/dL Final     Creatinine   Date Value Ref Range Status   04/01/2019 0.7 0.5 - 1.4 mg/dL Final     Calcium   Date Value Ref Range Status   04/01/2019 9.9 8.7 - 10.5 mg/dL Final     Total Protein   Date Value Ref Range Status   01/22/2015 6.7 6.0 - 8.4 g/dL Final     Albumin   Date Value Ref Range Status   01/22/2015 3.4  (L) 3.5 - 5.2 g/dL Final     Total Bilirubin   Date Value Ref Range Status   01/22/2015 0.6 0.1 - 1.0 mg/dL Final     Comment:     For infants and newborns, interpretation of results should be based  on gestational age, weight and in agreement with clinical  observations.  Premature Infant recommended reference ranges:  Up to 24 hours.............<8.0 mg/dL  Up to 48 hours............<12.0 mg/dL  3-5 days..................<15.0 mg/dL  6-29 days.................<15.0 mg/dL       Alkaline Phosphatase   Date Value Ref Range Status   01/22/2015 47 (L) 55 - 135 U/L Final     AST   Date Value Ref Range Status   01/22/2015 17 10 - 40 U/L Final     ALT   Date Value Ref Range Status   01/22/2015 18 10 - 44 U/L Final     Anion Gap   Date Value Ref Range Status   04/01/2019 9 8 - 16 mmol/L Final     eGFR if    Date Value Ref Range Status   04/01/2019 >60.0 >60 mL/min/1.73 m^2 Final     eGFR if non    Date Value Ref Range Status   04/01/2019 >60.0 >60 mL/min/1.73 m^2 Final     Comment:     Calculation used to obtain the estimated glomerular filtration  rate (eGFR) is the CKD-EPI equation.        No results found for: CEA        Assessment:       1. Anal fissure    2. 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  She is scheduled for an LIAS 04/11/2019 at [facility].    I have discussed the procedure at length with Ana Maria Turcios.  We discussed the rationale, risks, benefits, and alternatives in depth.  We discussed the expected outcomes and potential complications including but not limited to bleeding, infection, recurrence, prolonged pain, need for further procedures and altered continence.  She verbalized her understanding of the procedure and wishes to proceed.  Written consent was obtained.    She will continue to follow up  with Dr. Perfecto marinelli for management of her chronic constipation.    Trey Streeter MD, FACS, FASCRS  Senior Staff Surgeon  Department of Colon & Rectal Surgery

## 2019-04-01 NOTE — LETTER
April 1, 2019      [facility]  [address]  [facility] [address]  Phone: [phone]       Patient: Ana Maria Turcios   YOB: 1970  Date of Visit: 04/01/2019    To Whom It May Concern:    Julia Turcios  was at [facility] on 04/01/2019. She may return to work on 04/02/2019 with no restrictions. If you have any questions or concerns, or if I can be of further assistance, please do not hesitate to contact me.    Sincerely,    Demetrice Rushing MA

## 2019-04-01 NOTE — PROGRESS NOTES
Subjective:       Patient ID: Ana Maria Turcios is a 48 y.o. female.    Chief Complaint: Follow-up    HPI  46 yo F who presented 11/1/18 for evaluation of rectal bleeding and anal pain. She suffers from chronic constipation and takes Linzess.  She complained of sharp tearing pain with her bowel movements and bright red blood on the toilet paper when she wiped after bowel movements.  She had undergone a recent colonoscopy by , which I did not have a report from, but the patient stated that was normal aside from hemorrhoids.  No abdominal pain, unexplained weight loss, anorexia, recent change in bowel habits, or other constitutional symptoms.     On examination she had a posterior midline anal fissure as well as moderately inflamed anterior and posterior midline anal skin tags.  She was started on conservative measures including topical diltiazem that he has to follow up in 4 weeks.    Despite the fact that her symptoms did not improve with the above measures, she did not follow up with me until now, 5 months later.  She had been using the diltiazem cream without any improvement.  She continues to suffer from constipation and follows up with Dr. Grove for this.  She currently is taking Linzess, MiraLax and Colace, and states that she is having 1 hard bowel movement a day with significant straining.  She continues to have pain with bowel movements which she describes as ripping and tearing..  No significant bleeding with bowel movements.    No family hx of CRC or IBD.    Review of patient's allergies indicates:   Allergen Reactions    Penicillins Itching       Past Medical History:   Diagnosis Date    Anxiety     Chronic constipation     GERD (gastroesophageal reflux disease)     Headache, migraine     Restless leg syndrome        Past Surgical History:   Procedure Laterality Date    ANKLE SURGERY      ANKLE SURGERY Right     BREAST SURGERY  04/2014    left breast cyst    HEMORRHOID SURGERY       TUBAL LIGATION  1997       Current Outpatient Medications   Medication Sig Dispense Refill    ALPRAZolam (XANAX) 1 MG tablet Take 1 mg by mouth 2 (two) times daily.      ANUCORT-HC 25 mg suppository Place 1 suppository (25 mg total) rectally 2 (two) times daily as needed for Hemorrhoids. 12 suppository 2    diltiazem HCl (DILTIAZEM 2% CREAM) Apply topically 3 (three) times daily. Apply topically to anal area. 50 g 5    escitalopram oxalate (LEXAPRO) 20 MG tablet   3    fluticasone (FLONASE) 50 mcg/actuation nasal spray 1 spray (50 mcg total) by Each Nare route 2 (two) times daily. 1 Bottle 2    ketorolac (TORADOL) 10 mg tablet Take 1 tablet (10 mg total) by mouth every 6 (six) hours as needed for Pain. 15 tablet 0    lidocaine HCl-hydrocortison ac 3-0.5 % Kit Place 1 application rectally 2 (two) times daily. 20 kit 0    linaclotide (LINZESS) 145 mcg Cap capsule Take 145 mcg by mouth once daily.      meclizine (ANTIVERT) 12.5 mg tablet Take 1 tablet (12.5 mg total) by mouth 3 (three) times daily as needed for Dizziness. 12 tablet 0    trazodone (DESYREL) 25 MG tablet Take by mouth every evening.       No current facility-administered medications for this visit.        Family History   Problem Relation Age of Onset    Cancer Mother     Stomach cancer Mother     Diabetes Mother     Cancer Father     Throat cancer Father     Cancer Sister     No Known Problems Brother     Hypertension Sister     Hypertension Sister     Hypertension Sister     No Known Problems Brother     No Known Problems Brother     No Known Problems Brother     No Known Problems Brother        Social History     Socioeconomic History    Marital status:      Spouse name: Not on file    Number of children: Not on file    Years of education: Not on file    Highest education level: Not on file   Occupational History    Not on file   Social Needs    Financial resource strain: Not on file    Food insecurity:      Worry: Not on file     Inability: Not on file    Transportation needs:     Medical: Not on file     Non-medical: Not on file   Tobacco Use    Smoking status: Current Every Day Smoker     Packs/day: 0.25     Years: 20.00     Pack years: 5.00     Types: Cigarettes    Smokeless tobacco: Never Used   Substance and Sexual Activity    Alcohol use: Yes     Alcohol/week: 0.0 oz     Comment: Socially    Drug use: No    Sexual activity: Yes     Partners: Male     Birth control/protection: Surgical   Lifestyle    Physical activity:     Days per week: Not on file     Minutes per session: Not on file    Stress: Not on file   Relationships    Social connections:     Talks on phone: Not on file     Gets together: Not on file     Attends Anabaptist service: Not on file     Active member of club or organization: Not on file     Attends meetings of clubs or organizations: Not on file     Relationship status: Not on file   Other Topics Concern    Not on file   Social History Narrative    Not on file       Review of Systems   Constitutional: Negative for chills and fever.   HENT: Negative for congestion and sore throat.    Eyes: Negative for visual disturbance.   Respiratory: Negative for cough and shortness of breath.    Cardiovascular: Negative for chest pain and palpitations.   Gastrointestinal: Positive for constipation and rectal pain. Negative for abdominal distention, abdominal pain, anal bleeding, blood in stool, diarrhea, nausea and vomiting.        GERD   Endocrine: Negative for cold intolerance and heat intolerance.   Genitourinary: Negative for dysuria and frequency.   Musculoskeletal: Negative for arthralgias, back pain and neck pain.   Skin: Negative for rash.   Allergic/Immunologic: Negative for immunocompromised state.   Neurological: Positive for headaches. Negative for dizziness and light-headedness.   Hematological: Does not bruise/bleed easily.   Psychiatric/Behavioral: Negative for confusion. The patient  is nervous/anxious.        Objective:      Physical Exam   Constitutional: She is oriented to person, place, and time. She appears well-developed and well-nourished.   HENT:   Head: Normocephalic.   Pulmonary/Chest: Effort normal. No respiratory distress.   Abdominal: Soft. Bowel sounds are normal. She exhibits no distension and no mass. There is no tenderness. There is no rebound and no guarding.   Genitourinary:   Genitourinary Comments: Perineum - persistent, chronic appearing posterior midline anal fissure   Musculoskeletal: Normal range of motion.   Neurological: She is alert and oriented to person, place, and time.   Skin: Skin is warm and dry.   Psychiatric: She has a normal mood and affect.         Lab Results   Component Value Date    WBC 9.40 04/01/2019    HGB 14.8 04/01/2019    HCT 47.3 04/01/2019    MCV 94 04/01/2019     04/01/2019     BMP  Lab Results   Component Value Date     04/01/2019    K 4.4 04/01/2019     04/01/2019    CO2 26 04/01/2019    BUN 11 04/01/2019    CREATININE 0.7 04/01/2019    CALCIUM 9.9 04/01/2019    ANIONGAP 9 04/01/2019    ESTGFRAFRICA >60.0 04/01/2019    EGFRNONAA >60.0 04/01/2019     CMP  Sodium   Date Value Ref Range Status   04/01/2019 139 136 - 145 mmol/L Final     Potassium   Date Value Ref Range Status   04/01/2019 4.4 3.5 - 5.1 mmol/L Final     Chloride   Date Value Ref Range Status   04/01/2019 104 95 - 110 mmol/L Final     CO2   Date Value Ref Range Status   04/01/2019 26 23 - 29 mmol/L Final     Glucose   Date Value Ref Range Status   04/01/2019 76 70 - 110 mg/dL Final     BUN, Bld   Date Value Ref Range Status   04/01/2019 11 6 - 20 mg/dL Final     Creatinine   Date Value Ref Range Status   04/01/2019 0.7 0.5 - 1.4 mg/dL Final     Calcium   Date Value Ref Range Status   04/01/2019 9.9 8.7 - 10.5 mg/dL Final     Total Protein   Date Value Ref Range Status   01/22/2015 6.7 6.0 - 8.4 g/dL Final     Albumin   Date Value Ref Range Status   01/22/2015 3.4  (L) 3.5 - 5.2 g/dL Final     Total Bilirubin   Date Value Ref Range Status   01/22/2015 0.6 0.1 - 1.0 mg/dL Final     Comment:     For infants and newborns, interpretation of results should be based  on gestational age, weight and in agreement with clinical  observations.  Premature Infant recommended reference ranges:  Up to 24 hours.............<8.0 mg/dL  Up to 48 hours............<12.0 mg/dL  3-5 days..................<15.0 mg/dL  6-29 days.................<15.0 mg/dL       Alkaline Phosphatase   Date Value Ref Range Status   01/22/2015 47 (L) 55 - 135 U/L Final     AST   Date Value Ref Range Status   01/22/2015 17 10 - 40 U/L Final     ALT   Date Value Ref Range Status   01/22/2015 18 10 - 44 U/L Final     Anion Gap   Date Value Ref Range Status   04/01/2019 9 8 - 16 mmol/L Final     eGFR if    Date Value Ref Range Status   04/01/2019 >60.0 >60 mL/min/1.73 m^2 Final     eGFR if non    Date Value Ref Range Status   04/01/2019 >60.0 >60 mL/min/1.73 m^2 Final     Comment:     Calculation used to obtain the estimated glomerular filtration  rate (eGFR) is the CKD-EPI equation.        No results found for: CEA        Assessment:       1. Anal fissure    2. 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  She is scheduled for an LIAS 04/11/2019 at [facility].    I have discussed the procedure at length with Ana Maria Turcios.  We discussed the rationale, risks, benefits, and alternatives in depth.  We discussed the expected outcomes and potential complications including but not limited to bleeding, infection, recurrence, prolonged pain, need for further procedures and altered continence.  She verbalized her understanding of the procedure and wishes to proceed.  Written consent was obtained.    She will continue to follow up  with Dr. Perfecto marinelli for management of her chronic constipation.    Trey Streeter MD, FACS, FASCRS  Senior Staff Surgeon  Department of Colon & Rectal Surgery

## 2019-04-01 NOTE — LETTER
April 1, 2019      [facility]  [address]  [facility] [address]  Phone: [phone]       Patient: Ana Maria Turcios   YOB: 1970  Date of Visit: 04/01/2019    To Whom It May Concern:    Julia Turcios  was at [facility] on 04/01/2019. Her  and caregiver was with her. He will return to work 04/02/2019. If you have any questions or concerns, or if I can be of further assistance, please do not hesitate to contact me.    Sincerely,    Demetrice Rushing MA

## 2019-04-08 ENCOUNTER — ANESTHESIA EVENT (OUTPATIENT)
Dept: SURGERY | Facility: HOSPITAL | Age: 49
End: 2019-04-08
Payer: COMMERCIAL

## 2019-04-08 ENCOUNTER — HOSPITAL ENCOUNTER (OUTPATIENT)
Dept: PREADMISSION TESTING | Facility: HOSPITAL | Age: 49
Discharge: HOME | End: 2019-04-08
Attending: COLON & RECTAL SURGERY
Payer: COMMERCIAL

## 2019-04-08 VITALS — WEIGHT: 180 LBS | BODY MASS INDEX: 30.73 KG/M2 | HEIGHT: 64 IN

## 2019-04-08 NOTE — ANESTHESIA PREPROCEDURE EVALUATION
04/08/2019  Ana Maria Turcios is a 48 y.o., female.    Anesthesia Evaluation    I have reviewed the Patient Summary Reports.    I have reviewed the Nursing Notes.   I have reviewed the Medications.     Review of Systems  Anesthesia Hx:  No problems with previous Anesthesia    Social:  Non-Smoker, Smoker    Hematology/Oncology:  Hematology Normal   Oncology Normal     EENT/Dental:EENT/Dental Normal   Cardiovascular:  Cardiovascular Normal Exercise tolerance: good     Pulmonary:  Pulmonary Normal    Renal/:  Renal/ Normal     Hepatic/GI:   GERD, well controlled    Musculoskeletal:  Musculoskeletal Normal    Neurological:   Headaches    Endocrine:  Endocrine Normal    Dermatological:  Skin Normal    Psych:  Psychiatric Normal           Physical Exam  General:  Well nourished    Airway/Jaw/Neck:  Airway Findings: Tongue: Normal General Airway Assessment: Adult  Mallampati: II  TM Distance: Normal, at least 6 cm  Jaw/Neck Findings:  Neck ROM: Normal ROM      Dental:  Dental Findings: In tact        Mental Status:  Mental Status Findings:  Cooperative         Anesthesia Plan  Type of Anesthesia, risks & benefits discussed:  Anesthesia Type:  general  Patient's Preference:   Intra-op Monitoring Plan: standard ASA monitors  Intra-op Monitoring Plan Comments:   Post Op Pain Control Plan: multimodal analgesia  Post Op Pain Control Plan Comments:   Induction:   IV  Beta Blocker:  Patient is not currently on a Beta-Blocker (No further documentation required).       Informed Consent: Patient understands risks and agrees with Anesthesia plan.  Questions answered.   ASA Score: 2     Day of Surgery Review of History & Physical: I have interviewed and examined the patient. I have reviewed the patient's H&P dated: 4/11/19. There are no significant changes.  H&P update referred to the surgeon.         Ready For Surgery  From Anesthesia Perspective.

## 2019-04-08 NOTE — DISCHARGE INSTRUCTIONS
Pre Admit Instructions    Day and Date of Procedure: Thursday 4/11/19      · Call your doctor if you become ill before your surgery  · Someone will call you between 1 p.m. And 5 p.m.the workday before the procedure to give you an arrival time       - Before 7 a.m. Enter through Emergency Room       - 7 a.m. To 5 p.m. Enter through Patient Registration Main Lobby  · You must have a responsible  to bring you home    Do NOT eat or drink anything   past midnight before your procedure day    Please    · Do not wear makeup, jewelry, nail polish or body piercings  · Bring containers/solution for contacts, dentures, bridges - these and hearing aids will be removed before your procedure  · Do not bring cash, jewelry or valuables the day of your procedure   · No smoking at least 24 hours before your procedure  · Wear clothing that is comfortable and easy to take off and put on  · Do NOT shave for at least 5 days before your surgery    Review skin preparation handout before using. Shower with Hibiclens the Night before the procedure. Bring remaining Hibiclens with you the morning of surgery.                Information about your stay (Please Review)    Before Surgery  1. Cafeteria Meals: 7am to 10am; 11am to 1:30 pm; Dinner/Supper must may be ordered between 11:00 am and 4 pm from the [facility] [employer] Menu. Food will be available to  between 5 pm and 6 pm. The kitchen phone extension is [phone].  2. Your doctor may order and review labs, x-rays, ECG or other tests as a pre-surgery workup and will call you if there is need for follow up.  3. No smoking inside or outside the hospital on hospital grounds.  4. Wear clothing that is easy to take off and put on.  The hospital will provide you with a gown.  5. You may bring robe, slippers, nightwear, and toiletries (toothbrush, toothpaste, makeup).  6. If your doctor orders a Fleets Enema or other prep, follow package and/or doctors orders.  7. Brush your teeth and  rinse your mouth the morning of surgery, but dont swallow the water.  8. The nurse will ask questions and check your condition.  The doctor may judd your surgical site.  9. Compression boots may be put on your calves to reduce the risk of blood clots.  10. The doctor may order medicine to help you relax before surgery.  After Surgery  1. The nurse will check your temperature, breathing, blood pressure, heart rate, IV site, and surgery site.  2. A diet will be ordered-most start with ice chips and then advance slowly to other foods.  3. If you have IV fluids the IV pump will beep to let the nurse know that she needs to check it.  4. You may have a urinary catheter and staff may measure your oral intake and urine output.  5. Pain medication may be ordered by the doctor after surgery.  If you have a pain management device tell your caretakers not to press the button because of OVERDOSE RISK.  6. When the nurse or doctor tells you it is okay to get out of bed, ask for help until you are stable.  7. The nurse may ask you to turn, cough, and deep breathe to prevent lung problems.  You can use a pillow to hold your incision when you deep breathe or cough to reduce pain.  8. The nurse will give you discharge instructions--incision care, symptoms to report to your doctor, and your follow-up appointment when you are discharged.  You cannot drive yourself home.  Goal for Discharge from One Day Surgery  · Control pain with an oral medication  · Walk without feeling dizzy or weak  · Tolerate liquids well  · Urinate without difficulty    Things you can do to  Reduce the Risk of Infections or Complications  Wash Hands and use Waterless Hand Sanitizers  · Wash hands frequently with soap and warm water for at least 15 seconds.   · Use hand sanitizers (alcohol based) often at home and in public if hands are not visibly soiled  Take Antibiotic Exactly as Prescribed  · Do not stop antibiotics too soon; you risk developing infection  resistant to antibiotics  · Take your antibiotic even if you are feeling better and even if they upset your stomach  · Call the doctor if you cant tolerate the antibiotic or you have an allergic reaction  Stay Healthy  · Take medicines as prescribed by your doctor  · Keep your diabetes under control - diet and medication  · Get enough rest, exercise and eat a healthy diet  Keep the Wound Clean and Dry  · Wash hands before and after taking care of the incision (cut)  · Wash hands when you remove a dressing, before you touch/apply a new dressing  · Shower and clean incision with antibacterial soap and rinse well if the doctor approves  · Allow the cut to dry completely before putting on a clean dressing  · Do not touch the part of the bandage that will cover the incision  · Do not use ointments unless your doctor tells you to-can promote bacterial growth  · If ordered, put ointment directly on the dressing-do not touch the end of the tube  · Do not scrub, remove scabs, or leave a damp dressing on the incision  · Do not use peroxide or alcohol to clean the incision unless the doctor tells you to   · Do not let children, pets or anyone else contaminate the incision  Stop Smoking To Prevent Infection  · Stop smoking-Centers for Disease Control recommends 30 days before surgery  · Smokers get more infections after surgery-studies have shown 6 times the risk  · Smokers have more scarring and heal slower-open wounds get infected easier  Prevent Respiratory complications  · Stop smoking  · Turn, cough, and deep breathe even if you have some pain when you do so.  · Splint your incision with a pillow when you cough/deep breath, to help control pain.  · Do not lie in one position for long periods of time.   Prevent Blood Clots  · When you wake move your legs, flex your feet, rotate your ankles, wiggle your toes  · Get up when the doctor says its ok.  Dangle your feet from the side of the bed  · Report symptoms-leg pain,  redness/swelling, warm to touch; fever; shortness of breath, chest pain, severe upper back pain.

## 2019-04-11 ENCOUNTER — ANESTHESIA (OUTPATIENT)
Dept: SURGERY | Facility: HOSPITAL | Age: 49
End: 2019-04-11
Payer: COMMERCIAL

## 2019-04-11 ENCOUNTER — TELEPHONE (OUTPATIENT)
Dept: SURGERY | Facility: CLINIC | Age: 49
End: 2019-04-11

## 2019-04-11 ENCOUNTER — HOSPITAL ENCOUNTER (OUTPATIENT)
Facility: HOSPITAL | Age: 49
Discharge: HOME | End: 2019-04-11
Attending: COLON & RECTAL SURGERY | Admitting: COLON & RECTAL SURGERY
Payer: COMMERCIAL

## 2019-04-11 VITALS
BODY MASS INDEX: 30.14 KG/M2 | HEART RATE: 63 BPM | DIASTOLIC BLOOD PRESSURE: 60 MMHG | SYSTOLIC BLOOD PRESSURE: 127 MMHG | WEIGHT: 176.56 LBS | TEMPERATURE: 97 F | RESPIRATION RATE: 18 BRPM | HEIGHT: 64 IN | OXYGEN SATURATION: 99 %

## 2019-04-11 DIAGNOSIS — K60.2 ANAL FISSURE: Primary | ICD-10-CM

## 2019-04-11 LAB — B-HCG UR QL: NEGATIVE

## 2019-04-11 PROCEDURE — 00902 ANES ANORECTAL PX: CPT | Mod: QZ,P2 | Performed by: NURSE ANESTHETIST, CERTIFIED REGISTERED

## 2019-04-11 PROCEDURE — 36000707: Performed by: COLON & RECTAL SURGERY

## 2019-04-11 PROCEDURE — 46080 SPHNCTROTMY ANAL DIV SPHNCTR: CPT | Mod: ,,, | Performed by: COLON & RECTAL SURGERY

## 2019-04-11 PROCEDURE — 25000003 PHARM REV CODE 250: Performed by: COLON & RECTAL SURGERY

## 2019-04-11 PROCEDURE — 25000003 PHARM REV CODE 250: Performed by: NURSE PRACTITIONER

## 2019-04-11 PROCEDURE — 63600175 PHARM REV CODE 636 W HCPCS: Performed by: NURSE ANESTHETIST, CERTIFIED REGISTERED

## 2019-04-11 PROCEDURE — 25000003 PHARM REV CODE 250: Performed by: NURSE ANESTHETIST, CERTIFIED REGISTERED

## 2019-04-11 PROCEDURE — 46080 PR ANAL SPHINCTEROTOMY: ICD-10-PCS | Mod: ,,, | Performed by: COLON & RECTAL SURGERY

## 2019-04-11 PROCEDURE — 37000008 HC ANESTHESIA 1ST 15 MINUTES: Performed by: COLON & RECTAL SURGERY

## 2019-04-11 PROCEDURE — 37000009 HC ANESTHESIA EA ADD 15 MINS: Performed by: COLON & RECTAL SURGERY

## 2019-04-11 PROCEDURE — 63600175 PHARM REV CODE 636 W HCPCS: Performed by: COLON & RECTAL SURGERY

## 2019-04-11 PROCEDURE — 81025 URINE PREGNANCY TEST: CPT

## 2019-04-11 PROCEDURE — 36000706: Performed by: COLON & RECTAL SURGERY

## 2019-04-11 PROCEDURE — 71000033 HC RECOVERY, INTIAL HOUR: Performed by: COLON & RECTAL SURGERY

## 2019-04-11 RX ORDER — ACETAMINOPHEN 10 MG/ML
INJECTION, SOLUTION INTRAVENOUS
Status: DISCONTINUED | OUTPATIENT
Start: 2019-04-11 | End: 2019-04-11

## 2019-04-11 RX ORDER — BUPIVACAINE HYDROCHLORIDE 2.5 MG/ML
INJECTION, SOLUTION EPIDURAL; INFILTRATION; INTRACAUDAL
Status: DISCONTINUED | OUTPATIENT
Start: 2019-04-11 | End: 2019-04-11 | Stop reason: HOSPADM

## 2019-04-11 RX ORDER — LIDOCAINE HYDROCHLORIDE 20 MG/ML
INJECTION, SOLUTION EPIDURAL; INFILTRATION; INTRACAUDAL; PERINEURAL
Status: DISCONTINUED | OUTPATIENT
Start: 2019-04-11 | End: 2019-04-11

## 2019-04-11 RX ORDER — OXYCODONE AND ACETAMINOPHEN 5; 325 MG/1; MG/1
2 TABLET ORAL EVERY 4 HOURS PRN
Status: DISCONTINUED | OUTPATIENT
Start: 2019-04-11 | End: 2019-04-11 | Stop reason: HOSPADM

## 2019-04-11 RX ORDER — PROPOFOL 10 MG/ML
VIAL (ML) INTRAVENOUS
Status: DISCONTINUED | OUTPATIENT
Start: 2019-04-11 | End: 2019-04-11

## 2019-04-11 RX ORDER — OXYCODONE AND ACETAMINOPHEN 5; 325 MG/1; MG/1
1-2 TABLET ORAL EVERY 6 HOURS PRN
Qty: 20 TABLET | Refills: 0 | Status: SHIPPED | OUTPATIENT
Start: 2019-04-11 | End: 2019-04-21

## 2019-04-11 RX ORDER — MIDAZOLAM HYDROCHLORIDE 1 MG/ML
INJECTION, SOLUTION INTRAMUSCULAR; INTRAVENOUS
Status: DISCONTINUED | OUTPATIENT
Start: 2019-04-11 | End: 2019-04-11

## 2019-04-11 RX ORDER — FENTANYL CITRATE 50 UG/ML
INJECTION, SOLUTION INTRAMUSCULAR; INTRAVENOUS
Status: DISCONTINUED | OUTPATIENT
Start: 2019-04-11 | End: 2019-04-11

## 2019-04-11 RX ORDER — SODIUM CHLORIDE 9 MG/ML
INJECTION, SOLUTION INTRAVENOUS CONTINUOUS
Status: ACTIVE | OUTPATIENT
Start: 2019-04-11

## 2019-04-11 RX ORDER — HYDROMORPHONE HYDROCHLORIDE 2 MG/ML
0.5 INJECTION, SOLUTION INTRAMUSCULAR; INTRAVENOUS; SUBCUTANEOUS
Status: DISCONTINUED | OUTPATIENT
Start: 2019-04-11 | End: 2019-04-11 | Stop reason: HOSPADM

## 2019-04-11 RX ORDER — LIDOCAINE HYDROCHLORIDE 10 MG/ML
INJECTION, SOLUTION EPIDURAL; INFILTRATION; INTRACAUDAL; PERINEURAL
Status: DISCONTINUED | OUTPATIENT
Start: 2019-04-11 | End: 2019-04-11 | Stop reason: HOSPADM

## 2019-04-11 RX ORDER — MUPIROCIN 20 MG/G
OINTMENT TOPICAL
Status: ACTIVE | OUTPATIENT
Start: 2019-04-11

## 2019-04-11 RX ADMIN — ACETAMINOPHEN 1000 MG: 10 INJECTION, SOLUTION INTRAVENOUS at 10:04

## 2019-04-11 RX ADMIN — LIDOCAINE HYDROCHLORIDE 60 MG: 20 INJECTION, SOLUTION EPIDURAL; INFILTRATION; INTRACAUDAL; PERINEURAL at 10:04

## 2019-04-11 RX ADMIN — FENTANYL CITRATE 25 MCG: 50 INJECTION, SOLUTION INTRAMUSCULAR; INTRAVENOUS at 10:04

## 2019-04-11 RX ADMIN — SODIUM CHLORIDE: 0.9 INJECTION, SOLUTION INTRAVENOUS at 10:04

## 2019-04-11 RX ADMIN — PROPOFOL 150 MG: 10 INJECTION, EMULSION INTRAVENOUS at 10:04

## 2019-04-11 RX ADMIN — PROPOFOL 30 MG: 10 INJECTION, EMULSION INTRAVENOUS at 10:04

## 2019-04-11 RX ADMIN — HYDROMORPHONE HYDROCHLORIDE 0.5 MG: 2 INJECTION, SOLUTION INTRAMUSCULAR; INTRAVENOUS; SUBCUTANEOUS at 01:04

## 2019-04-11 RX ADMIN — MIDAZOLAM 2 MG: 1 INJECTION INTRAMUSCULAR; INTRAVENOUS at 10:04

## 2019-04-11 RX ADMIN — FENTANYL CITRATE 50 MCG: 50 INJECTION, SOLUTION INTRAMUSCULAR; INTRAVENOUS at 10:04

## 2019-04-11 NOTE — BRIEF OP NOTE
[facility]  Brief Operative Note    SUMMARY     Surgery Date: 4/11/2019     Surgeon(s) and Role:     * Trey Streeter MD - Primary    Assisting Surgeon: None    Pre-op Diagnosis:  Anal fissure [K60.2]    Operative Care:  Post-op Diagnosis: Post-Op Diagnosis Codes:     * Anal fissure [K60.2]    Procedure(s) (LRB):  SPHINCTEROTOMY-LATERAL INTERNAL ANAL (N/A)    Anesthesia: General  Total volume administered 500 cc    Technical Procedures Used: LIAS    Description of the findings of the procedure: PML fissure, hypertonic IAS    Wound Class (Contaminated    Complications: No     Estimated Blood Loss: * No values recorded between 4/11/2019 10:43 AM and 4/11/2019 10:50 AM * <5 cc          Specimens:   Specimen (12h ago, onward)    None          Implants: * No implants in log *    Post-Operative Care:         Disposition: PACU - hemodynamically stable.           Condition: Good  PT Temp >36 upon leaving the OR? Yes

## 2019-04-11 NOTE — OP NOTE
DATE OF PROCEDURE:  04/11/2019.    PREOPERATIVE DIAGNOSIS:  Chronic anal fissure.    POSTOPERATIVE DIAGNOSIS:  Chronic anal fissure.    PROCEDURE:  Lateral internal anal sphincterotomy.    SURGEON:  Trey Streeter M.D.    ASSISTANT:  None.    ANESTHESIA:  Local MAC.    INTRAVENOUS FLUIDS:  500 mL crystalloid.    ESTIMATED BLOOD LOSS:  Less than 5 mL.    DRAINS:  None.    SPECIMENS:  None.    COMPLICATIONS:  None.    OPERATIVE FINDINGS:  Posterior midline anal fissure, hypertonic internal anal   sphincter.    INDICATIONS:  The patient is a 48-year-old female with a history of chronic anal   fissure, which has not responded to conservative measures.  She presents today   for an elective lateral internal anal sphincterotomy.    DESCRIPTION OF PROCEDURE:  The patient was identified, brought to the Operating   Room and placed on the table in a prone position after obtaining informed   consent and after ensuring adequate padding of all pressure points.  After   initiation of monitored anesthesia care, the table was jackknifed and the   buttocks taped apart to efface the anal verge.  The perianal region was prepped   and draped in the usual sterile fashion.  An anal block was performed using a   combination of 1% lidocaine and 0.25% Marcaine.  We then inspected the anal   verge and identified a posterior midline anal fissure.  Inspection of the anal   canal with a bivalve anal speculum revealed grade I internal hemorrhoids, but no   other significant pathology.  I then used the bivalve retractor to place the   anal sphincter on stretch.  The intersphincteric groove was palpated in the   right lateral position and the internal anal sphincter muscle was noted to be   hypertonic.  A closed lateral internal anal sphincterotomy was then performed in   the right lateral position with a #11 blade.  Upon performing the   sphincterotomy, there was adequate release of the intersphincteric band.  The   incision was closed with a 3-0  chromic suture.  Anal canal was irrigated and   noted to be hemostatic.  Gelfoam gauze was placed in the anal canal and sterile   dressings were applied.    The patient tolerated the procedure well with no complications.  She was   awakened from sedation in the Operating Room and taken to Recovery in   satisfactory condition.  All needle, instrument and sponge counts were correct   at the end of the case.  I was present throughout the entire procedure.      LEONIDAS  dd: 04/11/2019 10:59:08 (CDT)  td: 04/11/2019 11:07:35 (CDT)  Doc ID   #[id number]  Job ID #[id number]    CC:

## 2019-04-11 NOTE — TELEPHONE ENCOUNTER
----- Message from Hayley Lopez sent at 4/11/2019  3:25 PM CDT -----  Contact: self [phone]  Patient Requesting Sooner Appointment.     Reason for sooner appt.: 3 wk post-op from 04/11  When is the first available appointment? 05/16  Communication Preference: self [phone]  Additional Information:

## 2019-04-11 NOTE — PLAN OF CARE
Problem: Adult Inpatient Plan of Care  Goal: Plan of Care Review  Outcome: Outcome(s) achieved Date Met: 04/11/19  Patient post op care, progressing. Up at [place]. Patient rates pain at 0 out of 10 currently. Pharmacy delivered home meds. Vital signs stable. Plan of care reviewed with patient.

## 2019-04-11 NOTE — TRANSFER OF CARE
"Anesthesia Transfer of Care Note    Patient: Ana Maria Turcios    Procedure(s) Performed: Procedure(s) (LRB):  SPHINCTEROTOMY-LATERAL INTERNAL ANAL (N/A)    Patient location: PACU    Anesthesia Type: general    Transport from OR: Transported from OR on 6-10 L/min O2 by face mask with adequate spontaneous ventilation    Post pain: adequate analgesia    Post assessment: no apparent anesthetic complications and tolerated procedure well    Post vital signs: stable    Level of consciousness: sedated    Nausea/Vomiting: no nausea/vomiting    Complications: none    Transfer of care protocol was followed      Last vitals:   Visit Vitals  /82 (BP Location: Left arm, Patient Position: Lying)   Pulse (!) 58   Temp 35.9 °C (96.7 °F) (Oral)   Resp 18   Ht 5' 4" (1.626 m)   Wt 80.1 kg (176 lb 9.4 oz)   SpO2 98%   Breastfeeding? No   BMI 30.31 kg/m²     "

## 2019-04-11 NOTE — INTERVAL H&P NOTE
The patient has been examined and the H&P has been reviewed:        I concur with the findings and no changes have occurred since H&P was written.        Patient cleared for Anesthesia: MAC        Anesthesia/Surgery risks, benefits and alternative options discussed and understood by patient/family.      Active Hospital Problems    Diagnosis  POA    Anal fissure [K60.2]  Yes      Resolved Hospital Problems   No resolved problems to display.

## 2019-04-11 NOTE — ANESTHESIA POSTPROCEDURE EVALUATION
Anesthesia Post Evaluation    Patient: Ana Maria Turcios    Procedure(s) Performed: Procedure(s) (LRB):  SPHINCTEROTOMY-LATERAL INTERNAL ANAL (N/A)    Final Anesthesia Type: general  Patient location during evaluation: PACU  Patient participation: Yes- Able to Participate  Level of consciousness: awake and alert and oriented  Post-procedure vital signs: reviewed and stable  Pain management: adequate  Airway patency: patent  PONV status at discharge: No PONV  Anesthetic complications: no      Cardiovascular status: blood pressure returned to baseline and hemodynamically stable  Respiratory status: unassisted, spontaneous ventilation and room air  Hydration status: euvolemic  Follow-up not needed.          Vitals Value Taken Time   /84 4/11/2019 11:20 AM   Temp 35.9 °C (96.7 °F) 4/11/2019 11:00 AM   Pulse 52 4/11/2019 11:20 AM   Resp 18 4/11/2019 11:20 AM   SpO2 97 % 4/11/2019 11:20 AM         Event Time     Out of Recovery 11:30:17          Pain/Stef Score: Stef Score: 8 (4/11/2019 10:51 AM)

## 2019-04-11 NOTE — DISCHARGE SUMMARY
Discharge Note      SUMMARY     Admit Date: 4/11/2019    Attending Physician: Trey Streeter MD     Discharge Physician: Trey Streeter MD    Discharge Date: 4/11/2019     Final Diagnosis: Post-Op Diagnosis Codes:     * Anal fissure [K60.2]    Hospital Course: Patient was admitted for an outpatient procedure and tolerated the procedure well with no complications.    Disposition: Home or Self Care    Follow Up/Patient Instructions:     High fiber diet/daily fiber supplement  8-10 glasses of water/day  Colace 100 mg 2x/day  Miralax 1 capful in glass of water 2x/day as needed for constipation  Avoid straining/constipation  Gauze packing will pass with 1st BM  Dry dressing as needed  Some bleeding is expected - call for excessive bleeding  Call for severe pain, fever >101, difficulty urinating, constipation  Motrin or Advil for mild to moderate pain  Percocet only for severe pain  Follow-up with Dr. Streeter in 3 weeks        Current Discharge Medication List      START taking these medications    Details   oxyCODONE-acetaminophen (PERCOCET) 5-325 mg per tablet Take 1-2 tablets by mouth every 6 (six) hours as needed for Pain.  Qty: 20 tablet, Refills: 0         CONTINUE these medications which have NOT CHANGED    Details   ALPRAZolam (XANAX) 1 MG tablet Take 1 mg by mouth 2 (two) times daily.      escitalopram oxalate (LEXAPRO) 20 MG tablet Refills: 3      fluticasone (FLONASE) 50 mcg/actuation nasal spray 1 spray (50 mcg total) by Each Nare route 2 (two) times daily.  Qty: 1 Bottle, Refills: 2    Associated Diagnoses: Viral syndrome; Bilateral acute serous otitis media, recurrence not specified      ketorolac (TORADOL) 10 mg tablet Take 1 tablet (10 mg total) by mouth every 6 (six) hours as needed for Pain.  Qty: 15 tablet, Refills: 0      linaclotide (LINZESS) 145 mcg Cap capsule Take 145 mcg by mouth once daily.      meclizine (ANTIVERT) 12.5 mg tablet Take 1 tablet (12.5 mg total) by mouth 3 (three) times daily as  needed for Dizziness.  Qty: 12 tablet, Refills: 0    Associated Diagnoses: Dizziness      trazodone (DESYREL) 25 MG tablet Take by mouth every evening.         STOP taking these medications       ANUCORT-HC 25 mg suppository Comments:   Reason for Stopping:         diltiazem HCl (DILTIAZEM 2% CREAM) Comments:   Reason for Stopping:         lidocaine HCl-hydrocortison ac 3-0.5 % Kit Comments:   Reason for Stopping:               Discharge Procedure Orders (must include Diet, Follow-up, Activity)   Discharge Procedure Orders (must include Diet, Follow-up, Activity)   Activity as tolerated

## 2019-04-12 NOTE — PLAN OF CARE
Problem: Adult Inpatient Plan of Care  Goal: Plan of Care Review  Outcome: Ongoing (interventions implemented as appropriate)  Patient progressing post op. Vital sign taken as ordered. Dilaudid effective for severe pain. Patient up at [place]. Tolerated diet. Voids freely. Safety and comfort maintained. Agrees with plan of care.

## 2019-05-02 ENCOUNTER — OFFICE VISIT (OUTPATIENT)
Dept: SURGERY | Facility: CLINIC | Age: 49
End: 2019-05-02
Payer: COMMERCIAL

## 2019-05-02 VITALS
BODY MASS INDEX: 32.7 KG/M2 | SYSTOLIC BLOOD PRESSURE: 100 MMHG | DIASTOLIC BLOOD PRESSURE: 60 MMHG | WEIGHT: 191.56 LBS | HEIGHT: 64 IN | HEART RATE: 68 BPM

## 2019-05-02 DIAGNOSIS — K60.2 ANAL FISSURE: Primary | ICD-10-CM

## 2019-05-02 DIAGNOSIS — K59.00 CONSTIPATION, UNSPECIFIED CONSTIPATION TYPE: ICD-10-CM

## 2019-05-02 PROCEDURE — 99024 PR POST-OP FOLLOW-UP VISIT: ICD-10-PCS | Mod: S$GLB,,, | Performed by: COLON & RECTAL SURGERY

## 2019-05-02 PROCEDURE — 99999 PR PBB SHADOW E&M-EST. PATIENT-LVL III: ICD-10-PCS | Mod: PBBFAC,,, | Performed by: COLON & RECTAL SURGERY

## 2019-05-02 PROCEDURE — 99999 PR PBB SHADOW E&M-EST. PATIENT-LVL III: CPT | Mod: PBBFAC,,, | Performed by: COLON & RECTAL SURGERY

## 2019-05-02 PROCEDURE — 99024 POSTOP FOLLOW-UP VISIT: CPT | Mod: S$GLB,,, | Performed by: COLON & RECTAL SURGERY

## 2019-05-02 RX ORDER — TRAZODONE HYDROCHLORIDE 100 MG/1
TABLET ORAL
Refills: 3 | COMMUNITY
Start: 2019-04-12

## 2019-05-02 NOTE — LETTER
May 2, 2019      Ernesto Grove MD  [address]  [address]  [facility] [address]           [facility]  [address] [place] [address]  Phone: [phone]  Fax: [phone]          Patient: Ana Maria Turcios   MR Number: [id number]   YOB: 1970   Date of Visit: 5/2/2019       Dear Dr Grove:    Thank you for referring Ana Maria Turcios to me for evaluation. Attached you will find relevant portions of my assessment and plan of care.    If you have questions, please do not hesitate to call me. I look forward to following Ana Maria Turcios along with you.    Sincerely,    Trey Streeter MD    Enclosure  CC:  Govind Ortiz MD    If you would like to receive this communication electronically, please contact [email or web address] or [phone] to request more information on [employer] access.    For providers and/or their staff who would like to refer a patient to [facility], please contact us through our one-stop-shop provider referral line, [facility], at [phone].    If you feel you have received this communication in error or would no longer like to receive these types of communications, please e-mail [email or web address]

## 2019-05-02 NOTE — PROGRESS NOTES
CRS Post-operative visit    Visit Info:     Procedure: LIAS    Date of Procedure: April 11, 2019    Indication:  49 yo F who presented 11/1/18 for evaluation of rectal bleeding and anal pain. She suffers from chronic constipation and takes Linzess.  She complained of sharp tearing pain with her bowel movements and bright red blood on the toilet paper when she wiped after bowel movements.  She had undergone a recent colonoscopy by , which I did not have a report from, but the patient stated that was normal aside from hemorrhoids.  No abdominal pain, unexplained weight loss, anorexia, recent change in bowel habits, or other constitutional symptoms.      On examination she had a posterior midline anal fissure as well as moderately inflamed anterior and posterior midline anal skin tags.  She was started on conservative measures including topical diltiazem that he has to follow up in 4 weeks.     Despite the fact that her symptoms did not improve with the above measures, she did not follow up with me until 04/01/2019, 5 months later.  She had been using the diltiazem cream without any improvement.  She continued to suffer from constipation and follows up with Dr. Grove for this.  She was taking Linzess, MiraLax and Colace at that time, and stated that she was having 1 hard bowel movement a day with significant straining.  She continued to have pain with bowel movements which she describes as ripping and tearing..  No significant bleeding with bowel movements.  On exam she had a chronic appearing persistent posterior midline anal fissure, and she elected to proceed with a lateral internal anal sphincterotomy.      Current Status:  She is doing very well, reports no significant anal pain, unless she is very constipated.  She has no bleeding with her bowel movements.  She has no problems with continence.  She does report having sweats which are severe at times.  No documented fevers.    Pathology:    N/A    Physical Exam:  General: Black or  female in NAD   Neuro: aaox4   Respiratory: resps even unlabored  Extremities: Warm dry and intact    Assessment:  Chronic anal fissure, s/p LIAS  Clinically improved    Plan:  Recommend continued increased fiber and fluid intake.    Continue follow-up with GI for management of constipation  I urged her to follow up with her primary care physician if her sweats persist, as I do not think that they seem to be related to her surgery.  OK to RTW  Otherwise RTO prn    Trey Streeter MD, FACS, FASCRS  Senior Staff Surgeon  Department of Colon & Rectal Surgery

## 2019-05-07 ENCOUNTER — TELEPHONE (OUTPATIENT)
Dept: SURGERY | Facility: CLINIC | Age: 49
End: 2019-05-07

## 2019-05-07 NOTE — TELEPHONE ENCOUNTER
----- Message from Davis Mauricio sent at 5/7/2019  4:37 PM CDT -----  Contact: Pt:[phone]  .Patient Returning Call from [facility]    Who Left Message for Patient:Melissa  Communication Preference:Pt:[phone]  Additional Information:

## 2019-05-07 NOTE — TELEPHONE ENCOUNTER
Dr. Streeter returning patient call to inform her that he can not prescribe pain medicine just in case she has pain on her cruise.

## 2020-01-24 ENCOUNTER — OFFICE VISIT (OUTPATIENT)
Dept: URGENT CARE | Facility: CLINIC | Age: 50
End: 2020-01-24
Payer: COMMERCIAL

## 2020-01-24 VITALS
BODY MASS INDEX: 31.34 KG/M2 | HEART RATE: 68 BPM | RESPIRATION RATE: 20 BRPM | WEIGHT: 195 LBS | SYSTOLIC BLOOD PRESSURE: 131 MMHG | OXYGEN SATURATION: 100 % | HEIGHT: 66 IN | TEMPERATURE: 98 F | DIASTOLIC BLOOD PRESSURE: 85 MMHG

## 2020-01-24 DIAGNOSIS — F41.1 GENERALIZED ANXIETY DISORDER: ICD-10-CM

## 2020-01-24 DIAGNOSIS — G89.29 OTHER CHRONIC PAIN: ICD-10-CM

## 2020-01-24 DIAGNOSIS — R10.13 EPIGASTRIC PAIN: ICD-10-CM

## 2020-01-24 DIAGNOSIS — Z72.0 TOBACCO ABUSE: Primary | ICD-10-CM

## 2020-01-24 DIAGNOSIS — K59.09 CHRONIC CONSTIPATION: ICD-10-CM

## 2020-01-24 DIAGNOSIS — K21.9 GASTROESOPHAGEAL REFLUX DISEASE, ESOPHAGITIS PRESENCE NOT SPECIFIED: ICD-10-CM

## 2020-01-24 DIAGNOSIS — R11.0 NAUSEA: ICD-10-CM

## 2020-01-24 PROCEDURE — 99214 OFFICE O/P EST MOD 30 MIN: CPT | Mod: S$GLB,,, | Performed by: NURSE PRACTITIONER

## 2020-01-24 PROCEDURE — 99214 PR OFFICE/OUTPT VISIT, EST, LEVL IV, 30-39 MIN: ICD-10-PCS | Mod: S$GLB,,, | Performed by: NURSE PRACTITIONER

## 2020-01-24 RX ORDER — OMEPRAZOLE 40 MG/1
CAPSULE, DELAYED RELEASE ORAL
Qty: 45 CAPSULE | Refills: 0 | Status: SHIPPED | OUTPATIENT
Start: 2020-01-24

## 2020-01-24 RX ORDER — SUCRALFATE 1 G/10ML
1 SUSPENSION ORAL 2 TIMES DAILY
COMMUNITY

## 2020-01-24 NOTE — PATIENT INSTRUCTIONS
Discharge Instructions for Gastroesophageal Reflux Disease (GERD)  Gastroesophageal reflux disease (GERD) is a backflow of acid from the stomach into the swallowing tube (esophagus).  Home care  These home care steps can help you manage GERD:  · Maintain a healthy weight. Get help to lose any extra pounds.  · Avoid lying down after meals.  · Avoid eating late at night.  · Elevate the head of your bed by 6 inches. You can do this by placing wooden blocks or bed risers under the head of your bed.  · Avoid wearing tight-fitting clothes.  · Avoid foods that might irritate your stomach, such as the following:  ¨ Alcohol  ¨ Fat  ¨ Chocolate  ¨ Caffeine  ¨ Spearmint or peppermint  · Talk to your healthcare provider if you are taking any of the following medicines. These medicines can make GERD symptoms worse:  ¨ Calcium channel blockers  ¨ Theophylline  ¨ Anticholinergic medicines, such as oxybutynin and benzatropine  · Begin an exercise program. Ask your healthcare provider how to get started. You can benefit from simple activities, such as walking or gardening.  · Break the smoking habit. Enroll in a stop-smoking program to improve your chances of success.  · Limit alcohol intake to no more than 2 drinks a day.  · Take your medicines exactly as directed. Dont skip doses.  · Avoid over-the-counter nonsteroidal anti-inflammatory medicines, such as aspirin and ibuprofen, unless recommended by your healthcare provider for certain conditions.   · If possible, avoid nitrates (heart medicines, such as nitroglycerin and isosorbide dinitrate ).  Follow-up care  Make a follow-up appointment as directed by our staff.     When to call the healthcare provider  Call your healthcare provider immediately if you have any of the following:  · Trouble swallowing  · Pain when swallowing  · Feeling of food caught in your chest or throat  · Pain in the neck, chest, or back  · Heartburn that causes you to vomit  · Vomiting blood  · Black or  tarry stools (from digested blood)  · More saliva (watering of the mouth) than usual  · Weight loss of more than 3% to 5% of your total body weight in a month  · Hoarseness or sore throat that wont go away  · Choking, coughing, or wheezing   Date Last Reviewed: 7/1/2016  © 6002-6351 [employer]. [address] [place] [address], [place] [address]. All rights reserved. This information is not intended as a substitute for professional medical care. Always follow your healthcare professional's instructions.

## 2020-01-24 NOTE — LETTER
January 24, 2020      [facility]  [place]  [address]  \[facility]\""[place] [address]  Phone: [phone]  Fax: [phone]       Patient: Ana Maria Turcios   YOB: 1970  Date of Visit: 01/24/2020    To Whom It May Concern:    Julia Turcios  was at [facility] on 01/24/2020. She may return to work/school on 1/25/2020 with no restrictions. If you have any questions or concerns, or if I can be of further assistance, please do not hesitate to contact me.    Sincerely,              Perri Fournier NP

## 2020-01-24 NOTE — PROGRESS NOTES
"Subjective:       Patient ID: Ana Maria Turcios is a 49 y.o. female.    Vitals:  height is 5' 6" (1.676 m) and weight is 88.5 kg (195 lb). Her tympanic temperature is 97.7 °F (36.5 °C). Her blood pressure is 131/85 and her pulse is 68. Her respiration is 20 and oxygen saturation is 100%.     Chief Complaint: Abdominal Pain    48 y/o female new to me presents with c/o epigastric pain x 2-3 weeks. Reports she sees Dr. Grove for GERD and Constipation. Takes Carafate and Linzess. No issues with her bowels. She is taking toradol for chronic back pain and left ankle pain-Rx'd per Dr. Ortiz. Reports she has been off her xanax and trazodone for nearing a month. She missed her appt and couldn't get another until Feb 20th.     Abdominal Cramping   This is a recurrent problem. The current episode started 1 to 4 weeks ago. The onset quality is gradual. The problem occurs constantly. The problem has been gradually worsening. The pain is located in the generalized abdominal region. The pain is at a severity of 8/10. The pain is moderate. The quality of the pain is aching and burning. The abdominal pain does not radiate. Associated symptoms include constipation (controlled with linzess) and nausea (especially after eating). Pertinent negatives include no arthralgias, diarrhea, dysuria, fever, frequency, headaches, hematochezia, myalgias or vomiting. The pain is aggravated by eating, coughing, movement and certain positions. The pain is relieved by nothing. She has tried acetaminophen and antacids for the symptoms. The treatment provided no relief. Prior diagnostic workup includes GI consult. Her past medical history is significant for GERD. There is no history of abdominal surgery.       Constitution: Positive for activity change and appetite change. Negative for chills, sweating, fatigue and fever.   HENT: Negative for congestion and sore throat.    Neck: Negative for painful lymph nodes.   Cardiovascular: Negative for chest " pain and leg swelling.   Eyes: Negative for double vision and blurred vision.   Respiratory: Positive for cough (chronic, smoker) and sputum production (brown on occasion). Negative for bloody sputum, COPD, shortness of breath, stridor, wheezing and asthma.    Gastrointestinal: Positive for abdominal pain, nausea (especially after eating), constipation (controlled with linzess), hemorrhoids (history of surgical repair) and heartburn. Negative for abdominal trauma, abdominal bloating, history of abdominal surgery, vomiting, diarrhea, bright red blood in stool, dark colored stools, rectal bleeding, rectal pain and bowel incontinence.   Genitourinary: Negative for dysuria, frequency, urgency and history of kidney stones.   Musculoskeletal: Positive for pain (chronic pain) and back pain. Negative for joint pain, joint swelling, muscle cramps and muscle ache.   Skin: Negative for color change, pale, rash and bruising.   Allergic/Immunologic: Negative for seasonal allergies and asthma.   Neurological: Negative for dizziness, history of vertigo, light-headedness, passing out and headaches.   Hematologic/Lymphatic: Negative for swollen lymph nodes.   Psychiatric/Behavioral: Negative for nervous/anxious, sleep disturbance and depression. The patient is not nervous/anxious.        Objective:      Physical Exam   Constitutional: She is oriented to person, place, and time. She appears well-developed and well-nourished. She is cooperative.  Non-toxic appearance. She does not have a sickly appearance. She does not appear ill. No distress.   HENT:   Head: Normocephalic and atraumatic.   Right Ear: Hearing, tympanic membrane and ear canal normal.   Left Ear: Hearing, tympanic membrane and ear canal normal.   Nose: Nose normal. No mucosal edema, rhinorrhea or nasal deformity. No epistaxis. Right sinus exhibits no maxillary sinus tenderness and no frontal sinus tenderness. Left sinus exhibits no maxillary sinus tenderness and no  frontal sinus tenderness.   Mouth/Throat: Uvula is midline and mucous membranes are normal. No trismus in the jaw. Normal dentition. No uvula swelling. No oropharyngeal exudate, posterior oropharyngeal edema or posterior oropharyngeal erythema.   Eyes: Conjunctivae and lids are normal. No scleral icterus.   Neck: Trachea normal, full passive range of motion without pain and phonation normal. Neck supple. No neck rigidity. No edema and no erythema present.   Cardiovascular: Normal rate, regular rhythm, normal heart sounds and normal pulses.   Pulmonary/Chest: Effort normal and breath sounds normal. No respiratory distress. She has no decreased breath sounds. She has no rhonchi.   Abdominal: Soft. Normal appearance and bowel sounds are normal. She exhibits no distension and no mass. There is tenderness. There is no rebound and no guarding. No hernia.   Epigastric ++   Musculoskeletal: Normal range of motion. She exhibits no edema or deformity.   Neurological: She is alert and oriented to person, place, and time. She exhibits normal muscle tone. Coordination normal.   Skin: Skin is warm, dry, intact, not diaphoretic and not pale.   Psychiatric: She has a normal mood and affect. Her speech is normal and behavior is normal. Judgment and thought content normal. Cognition and memory are normal.   Nursing note and vitals reviewed.        Assessment:       1. Tobacco abuse    2. Nausea    3. Gastroesophageal reflux disease, esophagitis presence not specified    4. Epigastric pain    5. Other chronic pain    6. Generalized anxiety disorder    7. Chronic constipation        Plan:         \1. Tobacco abuse  Advised pt on need to quit, she reports she is ready.   - Ambulatory referral to Smoking Cessation Program    2. Nausea  Chrissy after eating. This seems to be c/w gastritis. Already on carafate.     3. Gastroesophageal reflux disease, esophagitis presence not specified  Advised on use of PPI. Follow up with Dr. Grove in the  next couple weeks to assure this is getting better. No s/s of UGI Bleed at this point. Diet modifications discussed. STOP Toradol. This is know for ulcer/gi bleed issues in long term use   - omeprazole (PRILOSEC) 40 MG capsule; Take 1 cap bid AC x 2 weeks then every am AC  Dispense: 45 capsule; Refill: 0    4. Epigastric pain    - omeprazole (PRILOSEC) 40 MG capsule; Take 1 cap bid AC x 2 weeks then every am AC  Dispense: 45 capsule; Refill: 0    5. Other chronic pain  Stop toradol. OK for tylenol. Advised pt she would have to discuss further with her pcp. She was ok with this.     6. Generalized anxiety disorder  Has been off xanax for one month. ? Increase anxiety causing increased gastric secretions compounding to cause further gi complaints.     7. Chronic constipation  Doing well on linzess. Continue.

## 2020-07-27 ENCOUNTER — OFFICE VISIT (OUTPATIENT)
Dept: URGENT CARE | Facility: CLINIC | Age: 50
End: 2020-07-27
Payer: COMMERCIAL

## 2020-07-27 VITALS
HEART RATE: 66 BPM | BODY MASS INDEX: 31.34 KG/M2 | RESPIRATION RATE: 16 BRPM | DIASTOLIC BLOOD PRESSURE: 89 MMHG | OXYGEN SATURATION: 100 % | WEIGHT: 195 LBS | TEMPERATURE: 99 F | HEIGHT: 66 IN | SYSTOLIC BLOOD PRESSURE: 143 MMHG

## 2020-07-27 DIAGNOSIS — R11.0 NAUSEA: ICD-10-CM

## 2020-07-27 DIAGNOSIS — J20.8 ACUTE BACTERIAL BRONCHITIS: ICD-10-CM

## 2020-07-27 DIAGNOSIS — Z72.0 TOBACCO ABUSE: ICD-10-CM

## 2020-07-27 DIAGNOSIS — R06.02 SHORTNESS OF BREATH: ICD-10-CM

## 2020-07-27 DIAGNOSIS — B96.89 ACUTE BACTERIAL BRONCHITIS: ICD-10-CM

## 2020-07-27 DIAGNOSIS — R43.9 DISTURBANCE OF SMELL AND TASTE: ICD-10-CM

## 2020-07-27 DIAGNOSIS — U07.1 COVID-19 VIRUS INFECTION: ICD-10-CM

## 2020-07-27 DIAGNOSIS — R52 BODY ACHES: Primary | ICD-10-CM

## 2020-07-27 PROCEDURE — 99214 PR OFFICE/OUTPT VISIT, EST, LEVL IV, 30-39 MIN: ICD-10-PCS | Mod: S$GLB,,, | Performed by: NURSE PRACTITIONER

## 2020-07-27 PROCEDURE — U0003 INFECTIOUS AGENT DETECTION BY NUCLEIC ACID (DNA OR RNA); SEVERE ACUTE RESPIRATORY SYNDROME CORONAVIRUS 2 (SARS-COV-2) (CORONAVIRUS DISEASE [COVID-19]), AMPLIFIED PROBE TECHNIQUE, MAKING USE OF HIGH THROUGHPUT TECHNOLOGIES AS DESCRIBED BY CMS-2020-01-R: HCPCS

## 2020-07-27 PROCEDURE — 99214 OFFICE O/P EST MOD 30 MIN: CPT | Mod: S$GLB,,, | Performed by: NURSE PRACTITIONER

## 2020-07-27 RX ORDER — AZITHROMYCIN 250 MG/1
TABLET, FILM COATED ORAL
Qty: 6 TABLET | Refills: 0 | Status: SHIPPED | OUTPATIENT
Start: 2020-07-27

## 2020-07-27 RX ORDER — ALBUTEROL SULFATE 90 UG/1
2 AEROSOL, METERED RESPIRATORY (INHALATION) EVERY 4 HOURS PRN
Qty: 18 G | Refills: 0 | Status: SHIPPED | OUTPATIENT
Start: 2020-07-27 | End: 2020-08-26

## 2020-07-27 NOTE — PATIENT INSTRUCTIONS
Extensive counseling done in regards to covid screening as well as antibody testing. Advised pt to wear mask until 10 day post exposure lapses. Advised pt on use of inhaler.  Fluids, rest, vitamin C 1000 mg daily, Vitamin D 2,000 IU daily, Zinc 220 mg daily, motrin 200 mg ( at minimum daily ),  mg (precuations discussed). 10 days min quarantine. OK to resolve quarantine once 10 days AND 24 hours of symptom improvement has passes. Questions answered.     Instructions for Patients with Confirmed or Suspected COVID-19    If you are awaiting your test result, you will either be called or it will be released to the patient portal.  If you have any questions about your test, please visit [email or web address] or call our COVID-19 information line at [phone].      Instructions for non-hospitalized or discharged patients with confirmed or suspected COVID-19:       Stay home except to get medical care.    Separate yourself from other people and animals in your home.    Call ahead before visiting your doctor.    Wear a face mask.    Cover your coughs and sneezes.    Clean your hands often.    Avoid sharing personal household items.    Clean all high-touch surfaces every day.    Monitor your symptoms. Seek prompt medical attention if your illness is worsening (e.g., difficulty breathing). Before seeking care, call your healthcare provider.    If you have a medical emergency and must call 911, notify the dispatcher that you have or are being evaluated for COVID-19. If possible, put on a face mask before emergency medical services arrive.    Use the following symptom-based strategy to return to normal activity following a suspected or confirmed case of COVID-19. Continue isolation until:   o At least 3 days (72 hours) have passed since recovery defined as resolution of fever without the use of fever-reducing medications and improvement in respiratory symptoms (e.g. cough, shortness of breath),  and   o At least 10 days have passed since the first positive test.       As one of the next steps, you will receive a call or text from the [place] Department of Health ([facility]) COVID-19 Tracing Team. See the contact information below so you know not to ignore the health departments call. It is important that you contact them back immediately so they can help.     Contact Tracer Number:  [phone]  Caller ID for most carriers: [place] Dept Health    What is contact tracing?   Contact tracing is a process that helps identify everyone who has been in close contact with an infected person. Contact tracers let those people know they may have been exposed and guide them on next steps. Confidentiality is important for everyone; no one will be told who may have exposed them to the virus.   Your involvement is important. The more we know about where and how this virus is spreading, the better chance we have at stopping it from spreading further.  What does exposure mean?   Exposure means you have been within 6 feet for more than 15 minutes with a person who has or had COVID-19.  What kind of questions do the contact tracers ask?   A contact tracer will confirm your basic contact information including name, address, phone number, and next of kin, as well as asking about any symptoms you may have had. Theyll also ask you how you think you may have gotten sick, such as places where you may have been exposed to the virus, and people you were with. Those names will never be shared with anyone outside of that call, and will only be used to help trace and stop the spread of the virus.   I have privacy concerns. How will the state use my information?   Your privacy about your health is important. All calls are completed using call centers that use the appropriate health privacy protection measures (HIPAA compliance), meaning that your patient information is safe. No one will ever ask you any questions related to immigration  status. Your health comes first.   Do I have to participate?   You do not have to participate, but we strongly encourage you to. Contact tracing can help us catch and control new outbreaks as theyre developing to keep your friends and family safe.   What if I dont hear from anyone?   If you dont receive a call within 24 hours, you can call the number above right away to inquire about your status. That line is open from 8:00 am - 8:00 p.m., 7 days a week.  Contact tracing saves lives! Together, we have the power to beat this virus and keep our loved ones and neighbors safe.       Instructions for household members, intimate partners and caregivers in a non-healthcare setting of a patient with confirmed or suspected COVID-19:         Close contacts should monitor their health and call their healthcare provider right away if they develop symptoms suggestive of COVID-19 (e.g., fever, cough, shortness of breath).    Stay home except to get medical care. Separate yourself from other people and animals in the home.   Monitor the patients symptoms. If the patient is getting sicker, call his or her healthcare provider. If the patient has a medical emergency and you need to call 911, notify the dispatch personnel that the patient has or is being evaluated for COVID-19.    Wear a facemask when around other people such as sharing a room or vehicle and before entering a healthcare provider's office.   Cover coughs and sneezes with a tissue. Throw used tissues in a lined trash can immediately and wash hands.   Clean hands often with soap and water for at least 20 seconds or with an alcohol-based hand , rubbing hands together until they feel dry. Avoid touching your eyes, nose, and mouth with unwashed hands.   Clean all high-touch; surfaces every day, including counters, tabletops, doorknobs, bathroom fixtures, toilets, phones, keyboards, tablets, bedside tables, etc. Use a household cleaning spray or wipe  according to label instructions.   Avoid sharing personal household items such as dishes, drinking glasses, cups, towels, bedding, etc. After these items are used, they should be washed thoroughly with soap and water.   Continue isolation until:   At least 3 days (72 hours) have passed since recovery defined as resolution of fever without the use of fever-reducing medications and improvement in respiratory symptoms (e.g. cough, shortness of breath), and    At least 10 days have passed since the patients first positive test.    https://www.cdc.gov/coronavirus/2019-ncov/your-health/index.htm

## 2020-07-27 NOTE — PROGRESS NOTES
"Subjective:       Patient ID: Ana Maria Turcios is a 49 y.o. female.    Vitals:  height is 5' 6" (1.676 m) and weight is 88.5 kg (195 lb). Her tympanic temperature is 98.6 °F (37 °C). Her blood pressure is 143/89 (abnormal) and her pulse is 66. Her respiration is 16 and oxygen saturation is 100%.     Chief Complaint: Sinus Problem    48 yo female known to me presents with c/o waking up Friday am with body aches and mid sternum pain. Reports feeling sensation of sob on occasion.     Sinus Problem  This is a new problem. The current episode started in the past 7 days. The problem has been gradually worsening since onset. There has been no fever. Associated symptoms include chills, congestion, coughing, headaches, shortness of breath, sinus pressure, sneezing and swollen glands. Pertinent negatives include no diaphoresis, ear pain, hoarse voice, neck pain or sore throat. Past treatments include nothing. The treatment provided no relief.       Constitution: Positive for appetite change and chills. Negative for sweating, fatigue and fever.   HENT: Positive for congestion and sinus pressure. Negative for ear pain, sinus pain, sore throat and voice change.    Neck: Negative for neck pain and painful lymph nodes.   Eyes: Negative for eye redness.   Respiratory: Positive for cough and shortness of breath. Negative for chest tightness, sputum production, bloody sputum, COPD, stridor, wheezing and asthma.    Gastrointestinal: Positive for nausea. Negative for abdominal pain, vomiting, constipation and diarrhea.   Musculoskeletal: Positive for muscle ache.   Skin: Negative for rash.   Allergic/Immunologic: Positive for sneezing. Negative for seasonal allergies and asthma.   Neurological: Positive for headaches and tingling (on occ to the left forearm. ).        Loss of taste and smell   Hematologic/Lymphatic: Negative for swollen lymph nodes.       Objective:      Physical Exam   Constitutional: She is oriented to person, " place, and time. She appears well-developed. She is cooperative.  Non-toxic appearance. She does not appear ill. No distress.   HENT:   Head: Normocephalic and atraumatic.   Ears:   Right Ear: Hearing normal.   Left Ear: Hearing normal.   Nose: No mucosal edema, rhinorrhea, nasal deformity or congestion. No epistaxis. Right sinus exhibits no maxillary sinus tenderness and no frontal sinus tenderness. Left sinus exhibits no maxillary sinus tenderness and no frontal sinus tenderness.   Mouth/Throat: Uvula is midline and mucous membranes are normal. No trismus in the jaw. Normal dentition. No uvula swelling. No posterior oropharyngeal edema or posterior oropharyngeal erythema.   Eyes: Conjunctivae and lids are normal. No scleral icterus.   Neck: Trachea normal, full passive range of motion without pain and phonation normal. Neck supple. No neck rigidity. No edema and no erythema present.   Cardiovascular: Normal pulses.   Pulmonary/Chest: Effort normal. She has no decreased breath sounds. She has no rhonchi.   Abdominal: Normal appearance.   Musculoskeletal: Normal range of motion.   Neurological: She is alert and oriented to person, place, and time. She exhibits normal muscle tone. Coordination normal.   Skin: Skin is warm, dry, intact, not diaphoretic and not pale. Psychiatric: Her speech is normal and behavior is normal. Judgment and thought content normal.   Nursing note and vitals reviewed.        Assessment:       1. Body aches    2. Nausea    3. Disturbance of smell and taste    4. COVID-19 virus infection    5. Tobacco abuse    6. Shortness of breath    7. Acute bacterial bronchitis        Plan:       1. Body aches      2. Nausea    - COVID-19 Routine Screening    3. Disturbance of smell and taste    - COVID-19 Routine Screening    4. COVID-19 virus infection  Extensive counseling done in regards to covid screening as well as antibody testing. Advised pt to wear mask until 10 day post exposure lapses. Advised  pt on use of inhaler.  Fluids, rest, vitamin C 1000 mg daily, Vitamin D 2,000 IU daily, Zinc 220 mg daily, motrin 200 mg ( at minimum daily ),  mg (precuations discussed). 10 days min quarantine. OK to resolve quarantine once 10 days AND 24 hours of symptom improvement has passes. Questions answered.     - COVID-19 Routine Screening    5. Tobacco abuse  6. Shortness of breath-advised pt that oxygen is 100%. Excellent. Continue to monitor from home.   7. Acute bacterial bronchitis  Advised on precautions and use of inhaler.   - albuterol (PROVENTIL/VENTOLIN HFA) 90 mcg/actuation inhaler; Inhale 2 puffs into the lungs every 4 (four) hours as needed for Wheezing. Rescue  Dispense: 18 g; Refill: 0  - azithromycin (Z-QIANA) 250 MG tablet; Take 2 tablets by mouth x 1 for day 1 Then take 1 tablet by mouth daily for day 2 - 5  Dispense: 6 tablet; Refill: 0

## 2020-07-28 LAB — SARS-COV-2 RNA RESP QL NAA+PROBE: NOT DETECTED

## 2020-07-31 ENCOUNTER — TELEPHONE (OUTPATIENT)
Dept: URGENT CARE | Facility: CLINIC | Age: 50
End: 2020-07-31

## 2021-03-13 ENCOUNTER — OFFICE VISIT (OUTPATIENT)
Dept: URGENT CARE | Facility: CLINIC | Age: 51
End: 2021-03-13
Payer: COMMERCIAL

## 2021-03-13 VITALS
OXYGEN SATURATION: 99 % | DIASTOLIC BLOOD PRESSURE: 74 MMHG | TEMPERATURE: 98 F | HEART RATE: 80 BPM | SYSTOLIC BLOOD PRESSURE: 109 MMHG

## 2021-03-13 DIAGNOSIS — H10.31 ACUTE BACTERIAL CONJUNCTIVITIS OF RIGHT EYE: Primary | ICD-10-CM

## 2021-03-13 DIAGNOSIS — F17.200 SMOKER: ICD-10-CM

## 2021-03-13 DIAGNOSIS — B96.89 ACUTE BACTERIAL SINUSITIS: ICD-10-CM

## 2021-03-13 DIAGNOSIS — J01.90 ACUTE BACTERIAL SINUSITIS: ICD-10-CM

## 2021-03-13 PROCEDURE — 99204 PR OFFICE/OUTPT VISIT, NEW, LEVL IV, 45-59 MIN: ICD-10-PCS | Mod: S$GLB,,, | Performed by: INTERNAL MEDICINE

## 2021-03-13 PROCEDURE — 99204 OFFICE O/P NEW MOD 45 MIN: CPT | Mod: S$GLB,,, | Performed by: INTERNAL MEDICINE

## 2021-03-13 RX ORDER — AZITHROMYCIN 250 MG/1
250 TABLET, FILM COATED ORAL DAILY
Qty: 6 TABLET | Refills: 0 | Status: SHIPPED | OUTPATIENT
Start: 2021-03-13 | End: 2021-03-18

## 2021-03-13 RX ORDER — PREDNISONE 5 MG/1
5 TABLET ORAL DAILY
Qty: 5 TABLET | Refills: 0 | Status: SHIPPED | OUTPATIENT
Start: 2021-03-13 | End: 2021-03-18

## 2021-03-13 RX ORDER — POLYMYXIN B SULFATE AND TRIMETHOPRIM 1; 10000 MG/ML; [USP'U]/ML
1 SOLUTION OPHTHALMIC 4 TIMES DAILY
Qty: 10 BOTTLE | Refills: 0 | Status: SHIPPED | OUTPATIENT
Start: 2021-03-13 | End: 2021-03-18

## 2025-02-05 PROBLEM — M17.11 PRIMARY OSTEOARTHRITIS OF RIGHT KNEE: Status: ACTIVE | Noted: 2025-02-05

## 2025-04-15 ENCOUNTER — PATIENT OUTREACH (OUTPATIENT)
Dept: ADMINISTRATIVE | Facility: HOSPITAL | Age: 55
End: 2025-04-15

## 2025-04-15 NOTE — PROGRESS NOTES
Portal active: Yes  Chart reviewed, immunization record updated.  New results noted on [facility] or [facility] web site.  Care Everywhere updated.   Smoking Cessation Program Eligibility: Yes  Patient care coordination note  Next PCP visit 05/20/2025  LOV with PCP 03/19/2025    Attempted to contact patient about a cervical cancer screening. She is on the [facility] Cervical cancer screening gap report. Also want to ask her if she received the [place]-guard that Nichole ordered in March, ask for a remote BP reading and update smoking history. Left a message for a call back.

## 2025-06-30 ENCOUNTER — PATIENT OUTREACH (OUTPATIENT)
Dept: ADMINISTRATIVE | Facility: HOSPITAL | Age: 55
End: 2025-06-30

## 2025-06-30 NOTE — PROGRESS NOTES
Portal active: Last log in 3/15/25  Chart reviewed, immunization record updated.  No new results noted on [facility] or [facility] web site.  Care Everywhere updated.   Smoking Cessation Program Eligibility: Yes  Patient care coordination note  Next PCP visit Not scheduled  LOV with PCP 03/19/2025    Attempted to contact patient about a cervical cancer screening. She is on the [facility] Cervical cancer screening gap report. She also missed a lab and PCP appointment, and is overdue for a mammogram. Left a message for a call back.

## (undated) DEVICE — SLEEVE LITE DEVON

## (undated) DEVICE — SPONGE GAUZE 16PLY 4X4

## (undated) DEVICE — NDL GUARD

## (undated) DEVICE — HANDSWITCHING ROCKER SWITCH

## (undated) DEVICE — GAUZE SPONGE 4X4 12PLY

## (undated) DEVICE — NDL SAFETY 25G X 1.5 ECLIPSE

## (undated) DEVICE — SEE MEDLINE ITEM 157128

## (undated) DEVICE — ELECTRODE REM PLYHSV RETURN 9

## (undated) DEVICE — SCALPEL #11 BLADE STRL DISP

## (undated) DEVICE — PACK SET UP

## (undated) DEVICE — SPONGE SURGIFOAM 100 8.5X12X10

## (undated) DEVICE — SYR 30CC LUER LOCK

## (undated) DEVICE — SOL WATER STRL IRR 1000ML

## (undated) DEVICE — SUT CHROMIC 3-0 SH 27IN GUT

## (undated) DEVICE — SEE MEDLINE ITEM 157117

## (undated) DEVICE — NDL ECLIPSE SAFETY 18GX1-1/2IN

## (undated) DEVICE — JELLY LUBRICANT STERILE 4 OZ

## (undated) DEVICE — SPONGE DERMACEA GAUZE 4X4

## (undated) DEVICE — TOWELS STERILE 18 X 25.5

## (undated) DEVICE — GLOVE 8 PROTEXIS PI BLUE